# Patient Record
Sex: FEMALE | Race: WHITE | NOT HISPANIC OR LATINO
[De-identification: names, ages, dates, MRNs, and addresses within clinical notes are randomized per-mention and may not be internally consistent; named-entity substitution may affect disease eponyms.]

---

## 2017-11-07 PROBLEM — Z00.00 ENCOUNTER FOR PREVENTIVE HEALTH EXAMINATION: Status: ACTIVE | Noted: 2017-11-07

## 2017-11-29 ENCOUNTER — LABORATORY RESULT (OUTPATIENT)
Age: 22
End: 2017-11-29

## 2017-11-29 ENCOUNTER — APPOINTMENT (OUTPATIENT)
Dept: GASTROENTEROLOGY | Facility: CLINIC | Age: 22
End: 2017-11-29
Payer: COMMERCIAL

## 2017-11-29 VITALS
WEIGHT: 178 LBS | HEIGHT: 67 IN | DIASTOLIC BLOOD PRESSURE: 78 MMHG | TEMPERATURE: 97.5 F | OXYGEN SATURATION: 97 % | SYSTOLIC BLOOD PRESSURE: 112 MMHG | RESPIRATION RATE: 14 BRPM | HEART RATE: 69 BPM | BODY MASS INDEX: 27.94 KG/M2

## 2017-11-29 DIAGNOSIS — F15.90 OTHER STIMULANT USE, UNSPECIFIED, UNCOMPLICATED: ICD-10-CM

## 2017-11-29 DIAGNOSIS — Z80.0 FAMILY HISTORY OF MALIGNANT NEOPLASM OF DIGESTIVE ORGANS: ICD-10-CM

## 2017-11-29 DIAGNOSIS — Z78.9 OTHER SPECIFIED HEALTH STATUS: ICD-10-CM

## 2017-11-29 DIAGNOSIS — K58.2 MIXED IRRITABLE BOWEL SYNDROME: ICD-10-CM

## 2017-11-29 DIAGNOSIS — Z87.09 PERSONAL HISTORY OF OTHER DISEASES OF THE RESPIRATORY SYSTEM: ICD-10-CM

## 2017-11-29 DIAGNOSIS — Z87.19 PERSONAL HISTORY OF OTHER DISEASES OF THE DIGESTIVE SYSTEM: ICD-10-CM

## 2017-11-29 DIAGNOSIS — R10.13 EPIGASTRIC PAIN: ICD-10-CM

## 2017-11-29 PROCEDURE — 99205 OFFICE O/P NEW HI 60 MIN: CPT | Mod: 25

## 2017-11-29 PROCEDURE — 36415 COLL VENOUS BLD VENIPUNCTURE: CPT

## 2017-11-30 PROBLEM — Z87.09 HISTORY OF ASTHMA: Status: RESOLVED | Noted: 2017-11-30 | Resolved: 2017-11-30

## 2017-11-30 PROBLEM — Z78.9 NEVER SMOKED TOBACCO: Status: ACTIVE | Noted: 2017-11-29

## 2017-11-30 PROBLEM — R10.13 DYSPEPSIA: Status: ACTIVE | Noted: 2017-11-30

## 2017-11-30 PROBLEM — Z87.19 HISTORY OF HEMORRHOIDS: Status: RESOLVED | Noted: 2017-11-30 | Resolved: 2017-11-30

## 2017-11-30 PROBLEM — F15.90 CAFFEINE USE: Status: ACTIVE | Noted: 2017-11-29

## 2017-11-30 RX ORDER — MULTIVIT-MIN/IRON FUM/FOLIC AC 7.5 MG-4
TABLET ORAL
Refills: 0 | Status: ACTIVE | COMMUNITY

## 2017-12-05 ENCOUNTER — TRANSCRIPTION ENCOUNTER (OUTPATIENT)
Age: 22
End: 2017-12-05

## 2017-12-10 LAB
25(OH)D3 SERPL-MCNC: 15.9 NG/ML
ADJUSTED MITOGEN: >10 IU/ML
ADJUSTED TB AG: 0.01 IU/ML
ALBUMIN SERPL ELPH-MCNC: 4.7 G/DL
ALP BLD-CCNC: 47 U/L
ALT SERPL-CCNC: 10 U/L
ANION GAP SERPL CALC-SCNC: 18 MMOL/L
APPEARANCE: CLEAR
AST SERPL-CCNC: 16 U/L
BASOPHILS # BLD AUTO: 0.04 K/UL
BASOPHILS NFR BLD AUTO: 0.5 %
BILIRUB SERPL-MCNC: 0.5 MG/DL
BILIRUBIN URINE: NEGATIVE
BLOOD URINE: NEGATIVE
BUN SERPL-MCNC: 6 MG/DL
CALCIUM SERPL-MCNC: 10.1 MG/DL
CHLORIDE SERPL-SCNC: 102 MMOL/L
CO2 SERPL-SCNC: 22 MMOL/L
COLOR: ABNORMAL
CREAT SERPL-MCNC: 0.66 MG/DL
EOSINOPHIL # BLD AUTO: 0.51 K/UL
EOSINOPHIL NFR BLD AUTO: 6.6 %
GLUCOSE QUALITATIVE U: NEGATIVE MG/DL
GLUCOSE SERPL-MCNC: 92 MG/DL
HBA1C MFR BLD HPLC: 5.2 %
HCG SERPL-MCNC: <1 MIU/ML
HCT VFR BLD CALC: 40.1 %
HGB BLD-MCNC: 13.3 G/DL
HIV1+2 AB SPEC QL IA.RAPID: NONREACTIVE
HIVABINT: NORMAL
IGA SER QL IEP: 253 MG/DL
IMM GRANULOCYTES NFR BLD AUTO: 0.1 %
IRON SATN MFR SERPL: 17 %
IRON SERPL-MCNC: 56 UG/DL
KETONES URINE: ABNORMAL
LEUKOCYTE ESTERASE URINE: NEGATIVE
LYMPHOCYTES # BLD AUTO: 1.07 K/UL
LYMPHOCYTES NFR BLD AUTO: 13.9 %
M TB IFN-G BLD-IMP: NEGATIVE
MAN DIFF?: NORMAL
MCHC RBC-ENTMCNC: 28.1 PG
MCHC RBC-ENTMCNC: 33.2 GM/DL
MCV RBC AUTO: 84.8 FL
MONOCYTES # BLD AUTO: 0.58 K/UL
MONOCYTES NFR BLD AUTO: 7.5 %
NEUTROPHILS # BLD AUTO: 5.48 K/UL
NEUTROPHILS NFR BLD AUTO: 71.4 %
NITRITE URINE: NEGATIVE
PH URINE: 7
PLATELET # BLD AUTO: 323 K/UL
POTASSIUM SERPL-SCNC: 4 MMOL/L
PROT SERPL-MCNC: 8.2 G/DL
PROTEIN URINE: ABNORMAL MG/DL
QUANTIFERON GOLD NIL: 0.03 IU/ML
RBC # BLD: 4.73 M/UL
RBC # FLD: 13.9 %
SODIUM SERPL-SCNC: 142 MMOL/L
SPECIFIC GRAVITY URINE: 1.03
T4 FREE SERPL-MCNC: 0.9 NG/DL
TIBC SERPL-MCNC: 324 UG/DL
TSH SERPL-ACNC: 1.2 UIU/ML
TTG IGA SER IA-ACNC: <5 UNITS
TTG IGA SER-ACNC: NEGATIVE
UIBC SERPL-MCNC: 268 UG/DL
UROBILINOGEN URINE: NEGATIVE MG/DL
VIT B12 SERPL-MCNC: 425 PG/ML
WBC # FLD AUTO: 7.69 K/UL

## 2018-04-07 ENCOUNTER — EMERGENCY (EMERGENCY)
Facility: HOSPITAL | Age: 23
LOS: 1 days | Discharge: HOME | End: 2018-04-07
Attending: EMERGENCY MEDICINE | Admitting: EMERGENCY MEDICINE
Payer: COMMERCIAL

## 2018-04-07 VITALS
TEMPERATURE: 98 F | HEART RATE: 73 BPM | RESPIRATION RATE: 14 BRPM | OXYGEN SATURATION: 100 % | SYSTOLIC BLOOD PRESSURE: 124 MMHG | DIASTOLIC BLOOD PRESSURE: 78 MMHG

## 2018-04-07 LAB
ALBUMIN SERPL ELPH-MCNC: 4.2 G/DL — SIGNIFICANT CHANGE UP (ref 3.3–5)
ALP SERPL-CCNC: 55 U/L — SIGNIFICANT CHANGE UP (ref 40–120)
ALT FLD-CCNC: 10 U/L — SIGNIFICANT CHANGE UP (ref 4–33)
AMPHET UR-MCNC: NEGATIVE — SIGNIFICANT CHANGE UP
APAP SERPL-MCNC: < 15 UG/ML — LOW (ref 15–25)
APPEARANCE UR: CLEAR — SIGNIFICANT CHANGE UP
AST SERPL-CCNC: 12 U/L — SIGNIFICANT CHANGE UP (ref 4–32)
BARBITURATES UR SCN-MCNC: NEGATIVE — SIGNIFICANT CHANGE UP
BASOPHILS # BLD AUTO: 0.07 K/UL — SIGNIFICANT CHANGE UP (ref 0–0.2)
BASOPHILS NFR BLD AUTO: 0.8 % — SIGNIFICANT CHANGE UP (ref 0–2)
BENZODIAZ UR-MCNC: NEGATIVE — SIGNIFICANT CHANGE UP
BILIRUB SERPL-MCNC: 0.2 MG/DL — SIGNIFICANT CHANGE UP (ref 0.2–1.2)
BILIRUB UR-MCNC: NEGATIVE — SIGNIFICANT CHANGE UP
BLOOD UR QL VISUAL: HIGH
BUN SERPL-MCNC: 6 MG/DL — LOW (ref 7–23)
CALCIUM SERPL-MCNC: 9.2 MG/DL — SIGNIFICANT CHANGE UP (ref 8.4–10.5)
CANNABINOIDS UR-MCNC: NEGATIVE — SIGNIFICANT CHANGE UP
CHLORIDE SERPL-SCNC: 103 MMOL/L — SIGNIFICANT CHANGE UP (ref 98–107)
CO2 SERPL-SCNC: 25 MMOL/L — SIGNIFICANT CHANGE UP (ref 22–31)
COCAINE METAB.OTHER UR-MCNC: NEGATIVE — SIGNIFICANT CHANGE UP
COLOR SPEC: SIGNIFICANT CHANGE UP
CREAT SERPL-MCNC: 0.55 MG/DL — SIGNIFICANT CHANGE UP (ref 0.5–1.3)
EOSINOPHIL # BLD AUTO: 0.3 K/UL — SIGNIFICANT CHANGE UP (ref 0–0.5)
EOSINOPHIL NFR BLD AUTO: 3.6 % — SIGNIFICANT CHANGE UP (ref 0–6)
ETHANOL BLD-MCNC: < 10 MG/DL — SIGNIFICANT CHANGE UP
GLUCOSE SERPL-MCNC: 87 MG/DL — SIGNIFICANT CHANGE UP (ref 70–99)
GLUCOSE UR-MCNC: NEGATIVE — SIGNIFICANT CHANGE UP
HCG UR-SCNC: NEGATIVE — SIGNIFICANT CHANGE UP
HCT VFR BLD CALC: 39.9 % — SIGNIFICANT CHANGE UP (ref 34.5–45)
HGB BLD-MCNC: 12.7 G/DL — SIGNIFICANT CHANGE UP (ref 11.5–15.5)
IMM GRANULOCYTES # BLD AUTO: 0.01 # — SIGNIFICANT CHANGE UP
IMM GRANULOCYTES NFR BLD AUTO: 0.1 % — SIGNIFICANT CHANGE UP (ref 0–1.5)
KETONES UR-MCNC: NEGATIVE — SIGNIFICANT CHANGE UP
LEUKOCYTE ESTERASE UR-ACNC: SIGNIFICANT CHANGE UP
LYMPHOCYTES # BLD AUTO: 2.39 K/UL — SIGNIFICANT CHANGE UP (ref 1–3.3)
LYMPHOCYTES # BLD AUTO: 28.9 % — SIGNIFICANT CHANGE UP (ref 13–44)
MCHC RBC-ENTMCNC: 27.9 PG — SIGNIFICANT CHANGE UP (ref 27–34)
MCHC RBC-ENTMCNC: 31.8 % — LOW (ref 32–36)
MCV RBC AUTO: 87.7 FL — SIGNIFICANT CHANGE UP (ref 80–100)
METHADONE UR-MCNC: NEGATIVE — SIGNIFICANT CHANGE UP
MONOCYTES # BLD AUTO: 0.5 K/UL — SIGNIFICANT CHANGE UP (ref 0–0.9)
MONOCYTES NFR BLD AUTO: 6.1 % — SIGNIFICANT CHANGE UP (ref 2–14)
MUCOUS THREADS # UR AUTO: SIGNIFICANT CHANGE UP
NEUTROPHILS # BLD AUTO: 4.99 K/UL — SIGNIFICANT CHANGE UP (ref 1.8–7.4)
NEUTROPHILS NFR BLD AUTO: 60.5 % — SIGNIFICANT CHANGE UP (ref 43–77)
NITRITE UR-MCNC: NEGATIVE — SIGNIFICANT CHANGE UP
NRBC # FLD: 0 — SIGNIFICANT CHANGE UP
OPIATES UR-MCNC: NEGATIVE — SIGNIFICANT CHANGE UP
OXYCODONE UR-MCNC: NEGATIVE — SIGNIFICANT CHANGE UP
PCP UR-MCNC: NEGATIVE — SIGNIFICANT CHANGE UP
PH UR: 7.5 — SIGNIFICANT CHANGE UP (ref 4.6–8)
PLATELET # BLD AUTO: 383 K/UL — SIGNIFICANT CHANGE UP (ref 150–400)
PMV BLD: 9.6 FL — SIGNIFICANT CHANGE UP (ref 7–13)
POTASSIUM SERPL-MCNC: 4 MMOL/L — SIGNIFICANT CHANGE UP (ref 3.5–5.3)
POTASSIUM SERPL-SCNC: 4 MMOL/L — SIGNIFICANT CHANGE UP (ref 3.5–5.3)
PROT SERPL-MCNC: 7.8 G/DL — SIGNIFICANT CHANGE UP (ref 6–8.3)
PROT UR-MCNC: NEGATIVE MG/DL — SIGNIFICANT CHANGE UP
RBC # BLD: 4.55 M/UL — SIGNIFICANT CHANGE UP (ref 3.8–5.2)
RBC # FLD: 13.1 % — SIGNIFICANT CHANGE UP (ref 10.3–14.5)
RBC CASTS # UR COMP ASSIST: SIGNIFICANT CHANGE UP (ref 0–?)
SALICYLATES SERPL-MCNC: < 5 MG/DL — LOW (ref 15–30)
SODIUM SERPL-SCNC: 141 MMOL/L — SIGNIFICANT CHANGE UP (ref 135–145)
SP GR SPEC: 1.01 — SIGNIFICANT CHANGE UP (ref 1–1.04)
SP GR UR: 1.01 — SIGNIFICANT CHANGE UP (ref 1–1.03)
SQUAMOUS # UR AUTO: SIGNIFICANT CHANGE UP
TSH SERPL-MCNC: 1.14 UIU/ML — SIGNIFICANT CHANGE UP (ref 0.27–4.2)
UROBILINOGEN FLD QL: NORMAL MG/DL — SIGNIFICANT CHANGE UP
WBC # BLD: 8.26 K/UL — SIGNIFICANT CHANGE UP (ref 3.8–10.5)
WBC # FLD AUTO: 8.26 K/UL — SIGNIFICANT CHANGE UP (ref 3.8–10.5)
WBC UR QL: SIGNIFICANT CHANGE UP (ref 0–?)

## 2018-04-07 PROCEDURE — 99285 EMERGENCY DEPT VISIT HI MDM: CPT | Mod: 25

## 2018-04-07 PROCEDURE — 93010 ELECTROCARDIOGRAM REPORT: CPT

## 2018-04-07 NOTE — ED PROVIDER NOTE - OBJECTIVE STATEMENT
21 y/o F hx  Psychosis BIB family  w c/o bizarre behaviour secondary to  medication non compliance. Mother states that patient has been increasingly paranoid, not sleeping and has not been function at her baseline. Patient was recently discharged from [facility], 2 -3 weeks ago.   Denies falling, punching   or kicking any objects.  Denies pain, SOB, fever, chills, chest/ abdominal discomfort. Denies SI/HI/AH/VH.  No evidence of physical injuries, broken skin or ,deformities. Denies recent use of alcohol or illicit drugs.

## 2018-04-07 NOTE — ED PROVIDER NOTE - MEDICAL DECISION MAKING DETAILS
23 y/o F hx  Psychosis   Labs, Urine Tox/UA, EKG, HCG. No evidence of physical injuries, broken skin or deformities.   Medical evaluation performed. There is no clinical evidence of intoxication or any acute medical problem requiring immediate intervention. Patient is awaiting psychiatric consultation. Final disposition will be determined by psychiatrist.

## 2018-04-07 NOTE — ED ADULT TRIAGE NOTE - CHIEF COMPLAINT QUOTE
Pt brought in by her mother with concerns that the pt is exhibiting behaviors that are similar to an episode ~1 month ago which led the pt to be diagnosed with psychosis. In addition, the mother states that the pt has not been compliant with her psychiatric medications as prescribed. Pt denies any si/hi/avh/etoh-substance use

## 2018-04-08 VITALS
RESPIRATION RATE: 16 BRPM | HEART RATE: 70 BPM | DIASTOLIC BLOOD PRESSURE: 72 MMHG | SYSTOLIC BLOOD PRESSURE: 115 MMHG | TEMPERATURE: 98 F | OXYGEN SATURATION: 100 %

## 2018-04-08 DIAGNOSIS — R69 ILLNESS, UNSPECIFIED: ICD-10-CM

## 2018-04-08 DIAGNOSIS — F31.2 BIPOLAR DISORDER, CURRENT EPISODE MANIC SEVERE WITH PSYCHOTIC FEATURES: ICD-10-CM

## 2018-04-08 RX ORDER — ARIPIPRAZOLE 15 MG/1
5 TABLET ORAL DAILY
Qty: 0 | Refills: 0 | Status: DISCONTINUED | OUTPATIENT
Start: 2018-04-08 | End: 2018-04-11

## 2018-04-08 RX ADMIN — ARIPIPRAZOLE 5 MILLIGRAM(S): 15 TABLET ORAL at 07:49
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
## 2018-04-08 NOTE — ED BEHAVIORAL HEALTH NOTE - BEHAVIORAL HEALTH NOTE
Patient seen and evaluated. Took aripiprazole. Calm this AM.  On exam, pt is awake and alert, affect is neutral, somewhat bright, mildly inappropriate, speech is fluent, thought content somewhat impoverished. No suicidal ideations expressed. Limited insight  Vitals: 115/72 70 temp 98.1  A/P 22 year-old with disorganization/psychosis  in the context of non-adherence with meds, has been admitted 9.39, awaiting bed availability  Adherent with meds today  Continue aripiprazole

## 2018-04-08 NOTE — ED ADULT NURSE REASSESSMENT NOTE - NS ED NURSE REASSESS COMMENT FT1
Patient transported to [facility] via EMS, report given to Brook PFEIFFER, safety maintained.

## 2018-04-08 NOTE — ED BEHAVIORAL HEALTH ASSESSMENT NOTE - PRIMARY DX
Bipolar affective disorder, currently manic, severe, with psychotic features Deferred condition on axis II

## 2018-04-08 NOTE — ED BEHAVIORAL HEALTH NOTE - BEHAVIORAL HEALTH NOTE
Found a bed for patient at [facility], have spoken with Paula Fuentes NP, gave hand-off, 2 PC completed with administrative nurse on duty. Found a bed for patient at [facility], have spoken with Paula Fuentes NP, gave hand-off, 2 PC completed with administrative nurse on duty. Transfer discussed with patient and family, everyone is in agreement with plan.

## 2018-04-08 NOTE — ED BEHAVIORAL HEALTH NOTE - BEHAVIORAL HEALTH NOTE
Parisar was informed patient required inpatient admission and no beds were available at [facility] or [facility].  Writer identified a bed at [facility] based on bedboard.  Writer called phone number on bedboard  and was told by person who answered that she is the Unit chief and this is her private phone number.  She requested writer call the unit directly.  Writer called inpatient unit  where the phone was answered by Brook.  Brook stated, “this is an emergency phone call the other line” and hung up. Writer called again to obtain “the other line” but the line was busy for several minutes.  Writer called again and was transferred to the emergency room where writer spoke to Stella.  Stella in the ER states writer needs to speak to the unit directly.  Writer explained that writer had called the unit twice already and she placed call on hold while she found out what the protocol is to directly transfer a psychiatric patient to their service.  Writer was transferred to Arturo who then transferred call to Nurse Practitioner Mily Dumont .  Writer spoke to Ms. Dumont who requested writer fax clinical information to .  Ms. Dumont Nurse Practitioner states she is reviewing patients from [facility] and need to review patient’s chart to determine acceptance or denial. Parisar was informed patient required inpatient admission and no beds were available at [facility] or [facility].  Writer called [facility], there were no beds available.  Writer identified a bed at [facility] based on bedboard.  Writer called phone number on bedboard  and was told by person who answered that she is the Unit chief and this is her private phone number.  She requested writer call the unit directly.  Writer called inpatient unit  where the phone was answered by Brook.  Brook stated, “this is an emergency phone call the other line” and hung up. Writer called again to obtain “the other line” but the line was busy for several minutes.  Writer called again and was transferred to the emergency room where writer spoke to Stella.  Stella in the ER states writer needs to speak to the unit directly.  Writer explained that writer had called the unit twice already and she placed call on hold while she found out what the protocol is to directly transfer a psychiatric patient to their service.  Writer was transferred to Arturo who then transferred call to Nurse Practitioner Mily Dumont .  Writer spoke to Ms. Dumont who requested writer fax clinical information to .  Ms. Dumont Nurse Practitioner states she is reviewing patients from [place] and need to review patient’s chart to determine acceptance or denial.

## 2018-04-08 NOTE — ED BEHAVIORAL HEALTH ASSESSMENT NOTE - RISK ASSESSMENT
Pt has decompensated in the context of medication noncompliance and is acutely manic, posing a danger to self and others. Pt requires further stabilization in inpatient setting.
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
## 2018-07-23 PROBLEM — Z78.9 ALCOHOL USE: Status: ACTIVE | Noted: 2017-11-29

## 2018-07-23 PROBLEM — Z80.0 FAMILY HISTORY OF COLON CANCER: Status: INACTIVE | Noted: 2017-11-30

## 2018-11-26 ENCOUNTER — TRANSCRIPTION ENCOUNTER (OUTPATIENT)
Age: 23
End: 2018-11-26

## 2019-07-01 ENCOUNTER — TRANSCRIPTION ENCOUNTER (OUTPATIENT)
Age: 24
End: 2019-07-01

## 2020-07-09 ENCOUNTER — TRANSCRIPTION ENCOUNTER (OUTPATIENT)
Age: 25
End: 2020-07-09

## 2020-12-30 ENCOUNTER — TRANSCRIPTION ENCOUNTER (OUTPATIENT)
Age: 25
End: 2020-12-30

## 2022-12-12 ENCOUNTER — NON-APPOINTMENT (OUTPATIENT)
Age: 27
End: 2022-12-12

## 2023-01-10 ENCOUNTER — OUTPATIENT (OUTPATIENT)
Dept: OUTPATIENT SERVICES | Facility: HOSPITAL | Age: 28
LOS: 1 days | Discharge: STILL ADMITTED OR ADMINISTRATIVE | End: 2023-01-10

## 2023-01-10 PROBLEM — F29 UNSPECIFIED PSYCHOSIS NOT DUE TO A SUBSTANCE OR KNOWN PHYSIOLOGICAL CONDITION: Chronic | Status: ACTIVE | Noted: 2018-04-07

## 2023-01-11 ENCOUNTER — EMERGENCY (EMERGENCY)
Facility: HOSPITAL | Age: 28
LOS: 0 days | Discharge: ANOTHER HOSPITAL | End: 2023-01-12
Attending: EMERGENCY MEDICINE
Payer: COMMERCIAL

## 2023-01-11 VITALS — WEIGHT: 160.06 LBS | HEIGHT: 71 IN

## 2023-01-11 DIAGNOSIS — Z20.822 CONTACT WITH AND (SUSPECTED) EXPOSURE TO COVID-19: ICD-10-CM

## 2023-01-11 DIAGNOSIS — Z91.018 ALLERGY TO OTHER FOODS: ICD-10-CM

## 2023-01-11 DIAGNOSIS — F29 UNSPECIFIED PSYCHOSIS NOT DUE TO A SUBSTANCE OR KNOWN PHYSIOLOGICAL CONDITION: ICD-10-CM

## 2023-01-11 LAB
ALBUMIN SERPL ELPH-MCNC: 3.6 G/DL — SIGNIFICANT CHANGE UP (ref 3.3–5)
ALP SERPL-CCNC: 38 U/L — LOW (ref 40–120)
ALT FLD-CCNC: 20 U/L — SIGNIFICANT CHANGE UP (ref 12–78)
AMPHET UR-MCNC: NEGATIVE — SIGNIFICANT CHANGE UP
ANION GAP SERPL CALC-SCNC: 10 MMOL/L — SIGNIFICANT CHANGE UP (ref 5–17)
APAP SERPL-MCNC: < 2 UG/ML (ref 10–30)
APPEARANCE UR: CLEAR — SIGNIFICANT CHANGE UP
AST SERPL-CCNC: 33 U/L — SIGNIFICANT CHANGE UP (ref 15–37)
BACTERIA # UR AUTO: ABNORMAL
BARBITURATES UR SCN-MCNC: NEGATIVE — SIGNIFICANT CHANGE UP
BASOPHILS # BLD AUTO: 0.05 K/UL — SIGNIFICANT CHANGE UP (ref 0–0.2)
BASOPHILS NFR BLD AUTO: 0.6 % — SIGNIFICANT CHANGE UP (ref 0–2)
BENZODIAZ UR-MCNC: POSITIVE — SIGNIFICANT CHANGE UP
BILIRUB SERPL-MCNC: 1.1 MG/DL — SIGNIFICANT CHANGE UP (ref 0.2–1.2)
BILIRUB UR-MCNC: NEGATIVE — SIGNIFICANT CHANGE UP
BUN SERPL-MCNC: 11 MG/DL — SIGNIFICANT CHANGE UP (ref 7–23)
CALCIUM SERPL-MCNC: 8.4 MG/DL — LOW (ref 8.5–10.1)
CHLORIDE SERPL-SCNC: 109 MMOL/L — HIGH (ref 96–108)
CO2 SERPL-SCNC: 22 MMOL/L — SIGNIFICANT CHANGE UP (ref 22–31)
COCAINE METAB.OTHER UR-MCNC: NEGATIVE — SIGNIFICANT CHANGE UP
COLOR SPEC: YELLOW — SIGNIFICANT CHANGE UP
COVID-19 SPIKE DOMAIN AB INTERP: POSITIVE
COVID-19 SPIKE DOMAIN ANTIBODY RESULT: >250 U/ML — HIGH
CREAT SERPL-MCNC: 0.59 MG/DL — SIGNIFICANT CHANGE UP (ref 0.5–1.3)
DIFF PNL FLD: ABNORMAL
EGFR: 127 ML/MIN/1.73M2 — SIGNIFICANT CHANGE UP
EOSINOPHIL # BLD AUTO: 0.06 K/UL — SIGNIFICANT CHANGE UP (ref 0–0.5)
EOSINOPHIL NFR BLD AUTO: 0.7 % — SIGNIFICANT CHANGE UP (ref 0–6)
EPI CELLS # UR: ABNORMAL
ETHANOL SERPL-MCNC: <10 MG/DL — SIGNIFICANT CHANGE UP (ref 0–10)
FLUAV AG NPH QL: SIGNIFICANT CHANGE UP
FLUBV AG NPH QL: SIGNIFICANT CHANGE UP
GLUCOSE SERPL-MCNC: 75 MG/DL — SIGNIFICANT CHANGE UP (ref 70–99)
GLUCOSE UR QL: NEGATIVE MG/DL — SIGNIFICANT CHANGE UP
HCG SERPL-ACNC: <1 MIU/ML — SIGNIFICANT CHANGE UP
HCT VFR BLD CALC: 32.5 % — LOW (ref 34.5–45)
HGB BLD-MCNC: 10.7 G/DL — LOW (ref 11.5–15.5)
IMM GRANULOCYTES NFR BLD AUTO: 0.4 % — SIGNIFICANT CHANGE UP (ref 0–0.9)
KETONES UR-MCNC: ABNORMAL
LEUKOCYTE ESTERASE UR-ACNC: ABNORMAL
LYMPHOCYTES # BLD AUTO: 1.33 K/UL — SIGNIFICANT CHANGE UP (ref 1–3.3)
LYMPHOCYTES # BLD AUTO: 16.1 % — SIGNIFICANT CHANGE UP (ref 13–44)
MCHC RBC-ENTMCNC: 27 PG — SIGNIFICANT CHANGE UP (ref 27–34)
MCHC RBC-ENTMCNC: 32.9 G/DL — SIGNIFICANT CHANGE UP (ref 32–36)
MCV RBC AUTO: 82.1 FL — SIGNIFICANT CHANGE UP (ref 80–100)
METHADONE UR-MCNC: NEGATIVE — SIGNIFICANT CHANGE UP
MONOCYTES # BLD AUTO: 0.48 K/UL — SIGNIFICANT CHANGE UP (ref 0–0.9)
MONOCYTES NFR BLD AUTO: 5.8 % — SIGNIFICANT CHANGE UP (ref 2–14)
NEUTROPHILS # BLD AUTO: 6.32 K/UL — SIGNIFICANT CHANGE UP (ref 1.8–7.4)
NEUTROPHILS NFR BLD AUTO: 76.4 % — SIGNIFICANT CHANGE UP (ref 43–77)
NITRITE UR-MCNC: NEGATIVE — SIGNIFICANT CHANGE UP
NRBC # BLD: 0 /100 WBCS — SIGNIFICANT CHANGE UP (ref 0–0)
OPIATES UR-MCNC: NEGATIVE — SIGNIFICANT CHANGE UP
PCP SPEC-MCNC: SIGNIFICANT CHANGE UP
PCP UR-MCNC: NEGATIVE — SIGNIFICANT CHANGE UP
PH UR: 6 — SIGNIFICANT CHANGE UP (ref 5–8)
PLATELET # BLD AUTO: 295 K/UL — SIGNIFICANT CHANGE UP (ref 150–400)
POTASSIUM SERPL-MCNC: 3.7 MMOL/L — SIGNIFICANT CHANGE UP (ref 3.5–5.3)
POTASSIUM SERPL-SCNC: 3.7 MMOL/L — SIGNIFICANT CHANGE UP (ref 3.5–5.3)
PROT SERPL-MCNC: 6.9 GM/DL — SIGNIFICANT CHANGE UP (ref 6–8.3)
PROT UR-MCNC: 30 MG/DL
RBC # BLD: 3.96 M/UL — SIGNIFICANT CHANGE UP (ref 3.8–5.2)
RBC # FLD: 14 % — SIGNIFICANT CHANGE UP (ref 10.3–14.5)
RBC CASTS # UR COMP ASSIST: SIGNIFICANT CHANGE UP /HPF (ref 0–4)
SALICYLATES SERPL-MCNC: <1.7 MG/DL — LOW (ref 2.8–20)
SARS-COV-2 IGG+IGM SERPL QL IA: >250 U/ML — HIGH
SARS-COV-2 IGG+IGM SERPL QL IA: POSITIVE
SARS-COV-2 RNA SPEC QL NAA+PROBE: SIGNIFICANT CHANGE UP
SODIUM SERPL-SCNC: 141 MMOL/L — SIGNIFICANT CHANGE UP (ref 135–145)
SP GR SPEC: 1.02 — SIGNIFICANT CHANGE UP (ref 1.01–1.02)
THC UR QL: POSITIVE — SIGNIFICANT CHANGE UP
TSH SERPL-MCNC: 1.81 UIU/ML — SIGNIFICANT CHANGE UP (ref 0.36–3.74)
UROBILINOGEN FLD QL: NEGATIVE MG/DL — SIGNIFICANT CHANGE UP
WBC # BLD: 8.27 K/UL — SIGNIFICANT CHANGE UP (ref 3.8–10.5)
WBC # FLD AUTO: 8.27 K/UL — SIGNIFICANT CHANGE UP (ref 3.8–10.5)
WBC UR QL: ABNORMAL

## 2023-01-11 PROCEDURE — 70450 CT HEAD/BRAIN W/O DYE: CPT | Mod: 26,MA

## 2023-01-11 PROCEDURE — 90792 PSYCH DIAG EVAL W/MED SRVCS: CPT | Mod: 95

## 2023-01-11 PROCEDURE — 99285 EMERGENCY DEPT VISIT HI MDM: CPT

## 2023-01-11 PROCEDURE — 93010 ELECTROCARDIOGRAM REPORT: CPT

## 2023-01-11 RX ORDER — HALOPERIDOL DECANOATE 100 MG/ML
5 INJECTION INTRAMUSCULAR ONCE
Refills: 0 | Status: COMPLETED | OUTPATIENT
Start: 2023-01-11 | End: 2023-01-11

## 2023-01-11 RX ORDER — MIDAZOLAM HYDROCHLORIDE 1 MG/ML
4 INJECTION, SOLUTION INTRAMUSCULAR; INTRAVENOUS ONCE
Refills: 0 | Status: DISCONTINUED | OUTPATIENT
Start: 2023-01-11 | End: 2023-01-11

## 2023-01-11 RX ADMIN — HALOPERIDOL DECANOATE 5 MILLIGRAM(S): 100 INJECTION INTRAMUSCULAR at 05:27

## 2023-01-11 RX ADMIN — MIDAZOLAM HYDROCHLORIDE 4 MILLIGRAM(S): 1 INJECTION, SOLUTION INTRAMUSCULAR; INTRAVENOUS at 05:27

## 2023-01-11 RX ADMIN — Medication 2 MILLIGRAM(S): at 12:21

## 2023-01-11 RX ADMIN — HALOPERIDOL DECANOATE 5 MILLIGRAM(S): 100 INJECTION INTRAMUSCULAR at 02:42

## 2023-01-11 NOTE — ED BEHAVIORAL HEALTH ASSESSMENT NOTE - RISK ASSESSMENT
risk factors: non-compliance w/ treatment, social stressors, unstable housing, hx of hospitalizations    protective: has child, is knowledgeable about MH resources

## 2023-01-11 NOTE — ED PROVIDER NOTE - PHYSICAL EXAMINATION
Gen: AAOx3, NAD, agitated, aggressive, screaming, running about hospital grounds and inside ER  Head: ncat, perrla, eomi b/l  Neck: supple, no lymphadenopathy, no midline deviation  Heart: rrr, no m/r/g  Lungs: CTA b/l, no rales/ronchi/wheezes  Abd: soft, nontender, non-distended, no rebound or guarding  Ext: no clubbing/cyanosis/edema  Neuro: sensation and muscle strength intact b/l, steady gait, no focal weakness or sensory loss

## 2023-01-11 NOTE — ED BEHAVIORAL HEALTH ASSESSMENT NOTE - SUMMARY
The patient is a 26 yo woman, domiciled w/ various family members, has 3 yo son, with no sig PMH, and psych hx of bipolar disorder, cannabis use disorder, PTSD, 3 prior hospitalizations (most recently 2y ago in [place]), non-adherent to outpatient care, who self-presents with disorganized behavior after first eloping during evaluation at [facility] yesterday 1/10, where there was concern that she was acutely manic.    While patient is not presenting with any overt manic/psychotic symptoms currently, history from family is highly concerning for such symptoms including flight of ideas, grandiosity, mood lability/instability, irritability and aggression. Furthermore, the patient is a very poor historian with tangential speech, poor relatedness. The context of her presentation of walking out from the crisis center, wandering, then presenting to this ED agitated w/o being able to clearly describe preceding events is concerning for disorganized behavior and significantly impaired functioning. She is also not engaged in outpatient treatment. Therefore she meets criteria for involuntary admission due to inability to care properly for herself. While cannabis use may be a significant contributing factor, there is no e/o at this time that here symptoms are improving under observation or are explained entirely by intoxication.    Plan:  - admit to psych involuntary, 9.27 legals  - pt has been accepted for transfer to [facility] but per inpatient unit request, will be transferred in the AM  - can start Risperdal 1mg BID  - for acute agitation, can give Haldol 5mg + Ativan 2mg + Benadryl 50mg PO/IM q6h PRN

## 2023-01-11 NOTE — ED PROVIDER NOTE - OBJECTIVE STATEMENT
28 yo F with acute psychosis.  Pt. reportedly ran out of [facility] today after being brought there for admission for psychiatric reasons.  Pt. evaded security and left the hospital, reportedly missing or most of the day.  Pt. presents to ER at  with acute psychosis, agitated, screaming, yelling in ER, running around, cussing at staff and other patients, interfering with patient care.  no other specific complaints from patient.   ROS: negative for fever, cough, headache, chest pain, shortness of breath, abd pain, nausea, vomiting, diarrhea, rash, paresthesia, and weakness--all other systems reviewed are negative.   PMH: psychosis; Meds: Denies; SH: Denies smoking/drinking/drug use  Mom: Serena Oneilman, [phone] 28 yo F with acute psychosis.  Pt. reportedly ran out of [facility] today after being brought there for admission for psychiatric reasons.  Pt. evaded security and left the hospital, reportedly missing or most of the day.  Pt. presents to ER at  with acute psychosis, agitated, screaming, yelling in ER, running around, cussing at staff and other patients, interfering with patient care.  no other specific complaints from patient.   Update: pt. was a [facility] crisis center on 1/10/23--she likely ran out of that area, they are not allowed to forcibly restrain patients, no history of pt. being inpatient at [facility], per tele-psych   ROS: negative for fever, cough, headache, chest pain, shortness of breath, abd pain, nausea, vomiting, diarrhea, rash, paresthesia, and weakness--all other systems reviewed are negative.   PMH: psychosis; Meds: Denies; SH: Denies smoking/drinking/drug use  Mom: Serena Oneilman, [phone]

## 2023-01-11 NOTE — ED BEHAVIORAL HEALTH ASSESSMENT NOTE - DESCRIPTION
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 Cooperative during BH assessment

## 2023-01-11 NOTE — ED PROVIDER NOTE - PROGRESS NOTE DETAILS
telepsych called for info: pt. had a visit at [facility] (walk in clinic), and likely left that center, rather than [facility] Anil: Pt care signed out to me at change of shift, pending labs / Psych evaluation. Pt presented to Tele Psych. Anil: D/w Tele Psych, pt will be 2PC admission. Paperwork completed. Pending bed placement. Pt care signed out at change of shift. Patient care received on shift change, bed search pending.  Patient stating that she was under influence of ativan when interviewed, appears more coherent and says that she does not feel she needs admission.  ER course reviewed.  Telepsych team called to reassess, patient informed that she will be reassessed. Patient now acting erratic, tells me she never spoke with me, also told her 1:1 that she took 7 tylenol, however now saying she lied about it to get attention, will obtain acetaminophen level in 4 hours.  Psych team made aware of patient behavior. Patient 4 hour repeat tylenol level negative.  VSS.  Patient accepted to [facility] and is pending transfer this morning. Patient now acting erratic, tells me she never spoke with me, also told her 1:1 that she took 7 tylenol when she was in the bathroom, showed her RN an empty blister packet that she found in the bathroom, however now saying she lied about it to get attention and never took any tylenol, will obtain acetaminophen level in 4 hours.  Psych team made aware of patient behavior.

## 2023-01-11 NOTE — ED BEHAVIORAL HEALTH ASSESSMENT NOTE - HPI (INCLUDE ILLNESS QUALITY, SEVERITY, DURATION, TIMING, CONTEXT, MODIFYING FACTORS, ASSOCIATED SIGNS AND SYMPTOMS)
The patient is a 28 yo woman, domiciled w/ various family members, has 3 yo son, with no sig PMH, and psych hx of bipolar disorder, cannabis use disorder, PTSD, 3 prior hospitalizations (most recently 2y ago in [place]), non-adherent to outpatient care, who self-presents with disorganized behavior after first eloping during evaluation at [facility] yesterday 1/10, where there was concern that she was acutely manic.    On interview, the patient is a poor historian, unable to give a clear timeline of events and unable to clearly explain how she arrived at this ED today. Her speech is muffled, and tangential. Per triage note, patient presented as a walk-in. She seems to indicate now that she had waved down the police, says she stated she wanted to be somewhere warm, and they dropped her off at the hospital. The patient states she initially presented to the crisis center at the behest of her mother for a "mental health evaluation" but cannot specify what the concerns were. She describes being very stressed and overwhelmed juggling two jobs recently but otherwise denies psychiatric concerns. She says her mood has been "really happy." She denies any SI, denies AVH or paranoia.    Please see Crisis Intervention Note from Dariana for further details from that evaluation and corroborative.    Spoke to patient's "cousin"/friend Debra again ([phone]) who states that patient for the past several days has not seemed like herself, with large shift in mood including being unpredictably angry, and "talking about 30 different things." She also says that patient made a post on Facebook with grandiose content. There have been multiple psychosocial stressors related to family & work. Debra corroborates that after pt left the crisis center, she initially could not be found. She apparently made multiple stops including to a restaurant relatively far away and to a smoke shop. Police had been looking for her. Debra also confirms that pt's son is under care of Debra, her sister, and pt's mother altogether.

## 2023-01-11 NOTE — ED BEHAVIORAL HEALTH ASSESSMENT NOTE - NSSUICPROTFACT_PSY_ALL_CORE
Responsibility to children, family, or others/Supportive social network of family or friends/Cultural, spiritual and/or moral attitudes against suicide/Advent beliefs

## 2023-01-11 NOTE — ED BEHAVIORAL HEALTH NOTE - BEHAVIORAL HEALTH NOTE
===================   PRE-HOSPITAL COURSE   ==================   SOURCE:  Frieda PFEIFFER, and ED documentation    DETAILS:  Pt bibFamily to ED. Pt eloped from [facility] and was then brought in to  ED. Agitated during triage process.     ============   ED COURSE   ============   SOURCE: Frieda PFEIFFER, and secondhand ED documentation.   ARRIVAL: Per RN patient bibFamily to ED. Patient cooperative with triage process.    BELONGINGS:  Per RN, patient arrived with belongings. All belongings were provided to hospital security, and patient currently in a gown with a 1:1 staff member.   BEHAVIOR: RN described patient to be calm currently , remains in behavioral and impulse control, and is not in restraints. Pt currently has 1:1 sitter.    Pt is currently not displaying aggression towards staff or others. Per RN, pt presenting with even affect and mood is congruent with affect.   Pt has a linear thought process and is able to answer questions appropriately. RN stated that the patient is denying current SI/HI. Per RN pt denying A/VH.    There are no visible marks, bruises, or lacerations on the body. RN reported that the patient appears to be well groomed, has fair hygiene, and reports pt is IND with mobility and ADLs.    TREATMENT:  Haldol for sedation. No restraints.    VISITORS: None currently

## 2023-01-11 NOTE — ED PROVIDER NOTE - CLINICAL SUMMARY MEDICAL DECISION MAKING FREE TEXT BOX
28 yo F with likely acute psychosis  -basic labs, covid swab, etoh, drug screen, asa/salicylate, tsh, CT brain, CXR, ekg  -haldol for sedation/safety, monitor  -f/u results, reeval, psych eval

## 2023-01-12 ENCOUNTER — INPATIENT (INPATIENT)
Facility: HOSPITAL | Age: 28
LOS: 7 days | Discharge: HOME | End: 2023-01-20
Attending: PSYCHIATRY & NEUROLOGY | Admitting: PSYCHIATRY & NEUROLOGY
Payer: COMMERCIAL

## 2023-01-12 VITALS
HEIGHT: 72 IN | WEIGHT: 170.42 LBS | SYSTOLIC BLOOD PRESSURE: 140 MMHG | HEART RATE: 113 BPM | RESPIRATION RATE: 20 BRPM | DIASTOLIC BLOOD PRESSURE: 99 MMHG | TEMPERATURE: 98 F

## 2023-01-12 VITALS
OXYGEN SATURATION: 99 % | RESPIRATION RATE: 19 BRPM | DIASTOLIC BLOOD PRESSURE: 88 MMHG | HEART RATE: 101 BPM | SYSTOLIC BLOOD PRESSURE: 128 MMHG | TEMPERATURE: 98 F

## 2023-01-12 LAB — APAP SERPL-MCNC: 3 UG/ML — LOW (ref 10–30)

## 2023-01-12 RX ORDER — RISPERIDONE 4 MG/1
1 TABLET ORAL
Refills: 0 | Status: DISCONTINUED | OUTPATIENT
Start: 2023-01-12 | End: 2023-01-19

## 2023-01-12 RX ORDER — HALOPERIDOL DECANOATE 100 MG/ML
5 INJECTION INTRAMUSCULAR EVERY 6 HOURS
Refills: 0 | Status: DISCONTINUED | OUTPATIENT
Start: 2023-01-12 | End: 2023-01-20

## 2023-01-12 RX ADMIN — Medication 1 MILLIGRAM(S): at 02:09

## 2023-01-12 RX ADMIN — RISPERIDONE 1 MILLIGRAM(S): 4 TABLET ORAL at 20:00

## 2023-01-12 RX ADMIN — Medication 1 MILLIGRAM(S): at 06:52

## 2023-01-12 RX ADMIN — Medication 2 MILLIGRAM(S): at 20:01
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
## 2023-01-12 NOTE — BH SOCIAL WORK INITIAL PSYCHOSOCIAL EVALUATION - NSBHSPIRITUALPOWER_PSY_ALL_CORE
What Type Of Note Output Would You Prefer (Optional)?: Bullet Format How Severe Is Your Acne?: mild Is This A New Presentation, Or A Follow-Up?: Acne Additional Comments (Use Complete Sentences): Pt states she started a new hormone medication (norethisterone) in February and noticed that she started gaining weight shortly after and starting to have acne breakouts last month. She thinks it may be correlated. Pt states she stopped taking the medication 2 weeks ago. No

## 2023-01-12 NOTE — BH PATIENT PROFILE - FALL HARM RISK - UNIVERSAL INTERVENTIONS
Bed in lowest position, wheels locked, appropriate side rails in place/Call bell, personal items and telephone in reach/Instruct patient to call for assistance before getting out of bed or chair/Non-slip footwear when patient is out of bed/[place] to call system/Physically safe environment - no spills, clutter or unnecessary equipment/Purposeful Proactive Rounding/Room/bathroom lighting operational, light cord in reach

## 2023-01-12 NOTE — BH SOCIAL WORK INITIAL PSYCHOSOCIAL EVALUATION - OTHER PAST PSYCHIATRIC HISTORY (INCLUDE DETAILS REGARDING ONSET, COURSE OF ILLNESS, INPATIENT/OUTPATIENT TREATMENT)
Patient has a past psychiatric history of bipolar disorder, cannabis use disorder, and PTSD.  She has 3 prior inpatient psychiatric hospitalizations (most recently 2 years ago in [place])

## 2023-01-12 NOTE — BH CHART NOTE - NSEVENTNOTEFT_PSY_ALL_CORE
Contacted close family friend Debra ([phone]) for additional information. She stated patient has used marijuana in past, but has no history of disordered substance use and does not believe illicit substances/street drugs to be a component of the patient's current presentation.

## 2023-01-12 NOTE — ED BEHAVIORAL HEALTH NOTE - BEHAVIORAL HEALTH NOTE
Per RN patient remains in private room on 1:1 and is gowned/wanded. No SI/HI/AH/VH elicited; patient demonstrating waxing/waning episodes of agitation overnight. No social supports present overnight. Patient received Ativan 1mg PO for anxiety/agitation. Remains sleeping in hospital bed. Pending psychiatric admission.

## 2023-01-13 PROCEDURE — 99221 1ST HOSP IP/OBS SF/LOW 40: CPT

## 2023-01-13 PROCEDURE — 93010 ELECTROCARDIOGRAM REPORT: CPT

## 2023-01-13 RX ORDER — PANTOPRAZOLE SODIUM 20 MG/1
40 TABLET, DELAYED RELEASE ORAL
Refills: 0 | Status: DISCONTINUED | OUTPATIENT
Start: 2023-01-13 | End: 2023-01-20

## 2023-01-13 RX ORDER — ACETAMINOPHEN 500 MG
650 TABLET ORAL ONCE
Refills: 0 | Status: COMPLETED | OUTPATIENT
Start: 2023-01-13 | End: 2023-01-13

## 2023-01-13 RX ORDER — LANOLIN ALCOHOL/MO/W.PET/CERES
5 CREAM (GRAM) TOPICAL AT BEDTIME
Refills: 0 | Status: DISCONTINUED | OUTPATIENT
Start: 2023-01-13 | End: 2023-01-20

## 2023-01-13 RX ORDER — ACETAMINOPHEN 500 MG
650 TABLET ORAL EVERY 6 HOURS
Refills: 0 | Status: DISCONTINUED | OUTPATIENT
Start: 2023-01-13 | End: 2023-01-20

## 2023-01-13 RX ORDER — HYDROXYZINE HCL 10 MG
50 TABLET ORAL EVERY 6 HOURS
Refills: 0 | Status: DISCONTINUED | OUTPATIENT
Start: 2023-01-13 | End: 2023-01-20

## 2023-01-13 RX ORDER — PANTOPRAZOLE SODIUM 20 MG/1
40 TABLET, DELAYED RELEASE ORAL ONCE
Refills: 0 | Status: COMPLETED | OUTPATIENT
Start: 2023-01-13 | End: 2023-01-13

## 2023-01-13 RX ORDER — LITHIUM CARBONATE 300 MG/1
300 TABLET, EXTENDED RELEASE ORAL EVERY 12 HOURS
Refills: 0 | Status: DISCONTINUED | OUTPATIENT
Start: 2023-01-13 | End: 2023-01-20

## 2023-01-13 RX ADMIN — LITHIUM CARBONATE 300 MILLIGRAM(S): 300 TABLET, EXTENDED RELEASE ORAL at 20:08

## 2023-01-13 RX ADMIN — Medication 650 MILLIGRAM(S): at 10:53

## 2023-01-13 RX ADMIN — PANTOPRAZOLE SODIUM 40 MILLIGRAM(S): 20 TABLET, DELAYED RELEASE ORAL at 20:07

## 2023-01-13 RX ADMIN — Medication 650 MILLIGRAM(S): at 23:54

## 2023-01-13 RX ADMIN — RISPERIDONE 1 MILLIGRAM(S): 4 TABLET ORAL at 20:07

## 2023-01-13 RX ADMIN — Medication 2 MILLIGRAM(S): at 23:53

## 2023-01-13 RX ADMIN — Medication 50 MILLIGRAM(S): at 22:51

## 2023-01-13 RX ADMIN — Medication 650 MILLIGRAM(S): at 20:07

## 2023-01-13 RX ADMIN — RISPERIDONE 1 MILLIGRAM(S): 4 TABLET ORAL at 08:14

## 2023-01-13 RX ADMIN — Medication 50 MILLIGRAM(S): at 12:38

## 2023-01-13 RX ADMIN — Medication 30 MILLILITER(S): at 20:54

## 2023-01-13 NOTE — CONSULT NOTE ADULT - ASSESSMENT
27 year old woman admitted to [facility] for manic episode.    Bipolar d/o:  Mgmt per primary team    No active medical issues
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
## 2023-01-13 NOTE — BH INPATIENT PSYCHIATRY ASSESSMENT NOTE - RISK ASSESSMENT
rf: non-compliance with tx, significant substance use, residential stability, bipolar disorder   pf: health literacy, employment stability, engagement in care at this time

## 2023-01-13 NOTE — BH TREATMENT PLAN - ANXIETY/PANIC/FEAR NURSING INTERVENTIONS/RECOMMENDATIONS
RN to encourage medication compliance and provide support and education as needed on Dx, coping skills, medication, and safety planning  RN to observe for increasing anxiety and maintain a calm environment.  RN to monitor patient for s/s of anxiety.  RN will assess and intervene for any disruptive behaviors  RN to encourage daily ADL's  RN to encourage group attendance  RN will assess and intervene for any disruptive behaviors

## 2023-01-13 NOTE — BH INPATIENT PSYCHIATRY ASSESSMENT NOTE - CURRENT MEDICATION
MEDICATIONS  (STANDING):  lithium 300 milliGRAM(s) Oral every 12 hours  risperiDONE   Tablet 1 milliGRAM(s) Oral two times a day    MEDICATIONS  (PRN):  acetaminophen     Tablet .. 650 milliGRAM(s) Oral every 6 hours PRN Mild Pain (1 - 3), Moderate Pain (4 - 6), Severe Pain (7 - 10)  haloperidol     Tablet 5 milliGRAM(s) Oral every 6 hours PRN agitation  hydrOXYzine hydrochloride 50 milliGRAM(s) Oral every 6 hours PRN anxiety  LORazepam     Tablet 2 milliGRAM(s) Oral every 6 hours PRN Anxiety  melatonin. 5 milliGRAM(s) Oral at bedtime PRN Insomnia

## 2023-01-13 NOTE — BH INPATIENT PSYCHIATRY ASSESSMENT NOTE - NSBHATTESTCOMMENTATTENDFT_PSY_A_CORE
The patient is a 28 yo woman, domiciled w/ various family members, has 5 yo son, with no sig PMH, and psych hx of bipolar disorder, cannabis use disorder, PTSD, 3 prior hospitalizations (most recently 2y ago in [place]), non-adherent to outpatient care, who self-presents with disorganized behavior after first eloping during evaluation at [facility] yesterday 1/10, where there was concern that she was acutely manic.  I agree with the assessment and plan as above
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
## 2023-01-13 NOTE — BH TREATMENT PLAN - NSTXPSYCHOINTERRN_PSY_ALL_CORE
RN will assess for command auditory hallucination  RN will provide daily medication regimen  RN will offer PRN medication for worsening hallucinations  RN will ensure the enviroment is safe  RN will educate on coping skills/ encourage distractions to help manage auditory hallucination

## 2023-01-13 NOTE — BH INPATIENT PSYCHIATRY ASSESSMENT NOTE - NSBHASSESSSUMMFT_PSY_ALL_CORE
The patient is a 28 yo woman, domiciled w/ various family members, has 5 yo son, with no sig PMH, and psych hx of bipolar disorder, cannabis use disorder, PTSD, 3 prior hospitalizations (most recently 2y ago in [place]), non-adherent to outpatient care, who self-presents with disorganized behavior after first eloping during evaluation at [facility] yesterday 1/10, where there was concern that she was acutely manic.    On interview, the patient is a poor historian, unable to give a clear timeline of events and unable to clearly explain how she arrived at this ED today. Her speech is muffled, and tangential. Per triage note, patient presented as a walk-in. She seems to indicate now that she had waved down the police, says she stated she wanted to be somewhere warm, and they dropped her off at the hospital. The patient states she initially presented to the crisis center at the behest of her mother for a "mental health evaluation" but cannot specify what the concerns were. She describes being very stressed and overwhelmed juggling two jobs recently but otherwise denies psychiatric concerns. She says her mood has been "really happy." She denies any SI, denies AVH or paranoia.    On reassessment today, patient endorses hx consistent with bipolar disorder and endorses significant marijuana use and anxiety.     Impression:  - bipolar disorder, susanna   - cannabis-induced mood disorder (anxiety)   - possible anxiety disorder   - possible PTSD per hx     Plan:   #bipolar disorder  - lithium 300 mg q12h (started 1/13)  - risperidone 1mg bid (started 1/12)     #anxiety   - hydroxyzine hydrochloride 50 mg q6h PRN anxiety  - lorazepam 2mg q6h PRN Anxiety    #PRNs  - acetaminophen 650mg q6h PRN for pain   - haloperidol 5mg q6h PRN for agitation   - melatonin 5mg PO bedtime PRN Insomnia   The patient is a 26 yo woman, domiciled w/ various family members, has 5 yo son, with no sig PMH, and psych hx of bipolar disorder, cannabis use disorder, PTSD, 3 prior hospitalizations (most recently 2y ago in [place]), non-adherent to outpatient care, who self-presents with disorganized behavior after first eloping during evaluation at [facility] yesterday 1/10, where there was concern that she was acutely manic.    On interview, the patient is a poor historian, unable to give a clear timeline of events and unable to clearly explain how she arrived at this ED today. Her speech is muffled, and tangential. Per triage note, patient presented as a walk-in. She seems to indicate now that she had waved down the police, says she stated she wanted to be somewhere warm, and they dropped her off at the hospital. The patient states she initially presented to the crisis center at the behest of her mother for a "mental health evaluation" but cannot specify what the concerns were. She describes being very stressed and overwhelmed juggling two jobs recently but otherwise denies psychiatric concerns. She says her mood has been "really happy." She denies any SI, denies AVH or paranoia.    On assessment today, patient endorses hx consistent with bipolar disorder (euphoria, grandiosity, shows flight of ideas, reports increased goal-directed activity: walked 18miles), reports decreased sleep need, and shows talkativeness/pressured speech).  Patient also endorses significant marijuana use and anxiety.     Impression:  - bipolar disorder, susanna   - cannabis-induced mood disorder (anxiety)   - possible anxiety disorder   - possible PTSD per hx     Plan:   #admission  - admit 9.27 [facility] transfer   - f/u CBC, CMP, TSH, lipid panel, a1c ordered (for AM)      #bipolar disorder  - lithium 300 mg q12h (started 1/13)  - risperidone 1mg bid (started 1/12)     #anxiety   - hydroxyzine hydrochloride 50 mg q6h PRN anxiety  - lorazepam 2mg q6h PRN Anxiety    #PRNs  - acetaminophen 650mg q6h PRN for pain   - haloperidol 5mg q6h PRN for agitation   - melatonin 5mg PO bedtime PRN Insomnia   The patient is a 26 yo woman, domiciled w/ various family members, has 3 yo son, with no sig PMH, and psych hx of bipolar disorder, cannabis use disorder, PTSD, 3 prior hospitalizations (most recently 2y ago in [place]), non-adherent to outpatient care, who self-presents with disorganized behavior after first eloping during evaluation at [facility] yesterday 1/10, where there was concern that she was acutely manic.    On interview, the patient is a poor historian, unable to give a clear timeline of events and unable to clearly explain how she arrived at this ED today. Her speech is muffled, and tangential. Per triage note, patient presented as a walk-in. She seems to indicate now that she had waved down the police, says she stated she wanted to be somewhere warm, and they dropped her off at the hospital. The patient states she initially presented to the crisis center at the behest of her mother for a "mental health evaluation" but cannot specify what the concerns were. She describes being very stressed and overwhelmed juggling two jobs recently but otherwise denies psychiatric concerns. She says her mood has been "really happy." She denies any SI, denies AVH or paranoia.    On assessment today, patient endorses hx consistent with bipolar disorder (euphoria, grandiosity, shows flight of ideas, reports increased goal-directed activity: walked 18miles), reports decreased sleep need, and shows talkativeness/pressured speech).  Patient also endorses significant marijuana use and anxiety.     Impression:  - bipolar disorder, susanna   - cannabis-induced mood disorder (anxiety)   - possible anxiety disorder   - possible PTSD per hx     Plan:   #admission  - admit 9.27 [facility] transfer   - f/u CBC, CMP, TSH, lipid panel, a1c ordered (for AM)      #bipolar disorder  - lithium 300 mg q12h (started 1/13)  - lithium levels ordered for Tues 1/17 AM  - risperidone 1mg bid (started 1/12)     #anxiety   - hydroxyzine hydrochloride 50 mg q6h PRN anxiety  - lorazepam 2mg q6h PRN Anxiety    #PRNs  - acetaminophen 650mg q6h PRN for pain   - haloperidol 5mg q6h PRN for agitation   - melatonin 5mg PO bedtime PRN Insomnia

## 2023-01-13 NOTE — BH INPATIENT PSYCHIATRY ASSESSMENT NOTE - PATIENT'S CHIEF COMPLAINT
****THIS IS AN INCOMPLETE NOTE. ****PLAN HAS NOT BEEN FINALIZED BY ATTENDING. ****FULL NOTE TO FOLLOW SHORTLY. ****  "I'm ready to go"

## 2023-01-13 NOTE — BH TREATMENT PLAN - NSTXPLANTHERAPYSESSIONSFT_PSY_ALL_CORE
Hydroxychloroquine Pregnancy And Lactation Text: This medication has been shown to cause fetal harm but it isn't assigned a Pregnancy Risk Category. There are small amounts excreted in breast milk.   01-12-23  Type of therapy: Coping skills  Type of session: Group  Level of patient participation: Engaged  Duration of participation: 15 minutes  Therapy conducted by: Social work  Therapy Summary: Group session facilitated by JACOB wallace. In a game-oriented setting, questions such as “What activities make you feel better?” and “I feel the most stressed when?” were explored to help increase feelings of self-efficacy through an interactive psychoeducational exercise.   Serena was an active and engaged participant in the group discussions promoted by the activity.    01-13-23  Type of therapy: Coping skills,Creative arts therapy,Inspiration and motiviation,Leisure development,Stress management  Type of session: Group  Level of patient participation: Participates  Duration of participation: 45 minutes  Therapy conducted by: Psych rehab  Therapy Summary: Pt attended RT session , engaged in creative arts , pt was social ,calm and appropriate and cooperative in session .    01-13-23  Type of therapy: Mindfulness  Type of session: Group  Level of patient participation: Participates  Duration of participation: 30 minutes  Therapy conducted by: Social work  Therapy Summary: Group session facilitated by JACOB wallace to explore the benefits of mindfulness and meditation. The “Leaves on a Stream”, a brief visual meditative practice was facilitated and encouraged as a grounding resource to use in the future to manage stress and remain in good behavioral control. The importance of acquiring skills such as mindfulness practices post discharge were encouraged and participants were able to identify tools they could use in the future when feeling dysregulated.     Serena was engaged and present during the group session and receives redirection well. Serena has fair insight surrounding her behavior and recognizes that group meetings are opportunities to practice mindfulness exercises and grounding techniques. Serena expresses interest in developing greater habits oriented to the explored skills.    01-13-23  Type of therapy: Other,8 DIMENSIONS OF WELLNESS  Type of session: Group  Level of patient participation: Attentive,Engaged,Participates  Duration of participation: 45 minutes  Therapy conducted by: Nursing  --    01-13-23  Type of therapy: Cognitive behavior therapy  Type of session: Group  Level of patient participation: Engaged  Duration of participation: 45 minutes  Therapy conducted by: Social work  Therapy Summary: Group session facilitated by JACOB wallace to assist in developing greater insight into cognitive distortions. Reflective questions were asked to prompt discussion and self-reflection. Through interactive group work participants gained greater insight into identifying how cognitive distortions impact behavior and relationships.     Serena was able to identify how to reframe cognitive distortions while developing greater insight into the importance of self-reflection. Serena was an active and engaged participant in the group exercise and discussion.

## 2023-01-13 NOTE — BH INPATIENT PSYCHIATRY ASSESSMENT NOTE - OTHER PAST PSYCHIATRIC HISTORY (INCLUDE DETAILS REGARDING ONSET, COURSE OF ILLNESS, INPATIENT/OUTPATIENT TREATMENT)
4 prior inpt psych admssions for bipolar disorder with psychosis  4 prior inpt psych admissions for bipolar disorder with psychosis

## 2023-01-13 NOTE — BH INPATIENT PSYCHIATRY ASSESSMENT NOTE - NSBHMETABOLIC_PSY_ALL_CORE_FT
BMI: BMI (kg/m2): 11.3 (01-12-23 @ 13:06)  HbA1c:   Glucose:   BP: 118/67 (01-13-23 @ 08:19) (118/67 - 140/99)  Lipid Panel:

## 2023-01-13 NOTE — UM REPORT PROGRESS NOTE - NSUMRMPROVIDER_GEN_A_CORE FT
Patient: Serena Swartz  : 1995  INSURANCE: [facility]  ID# [id number]   [phone]     23- Spoke to Britney Sharif # is pending [id number].  Authorization is pending, no fax is needed at this time. Awaiting call back from  to review clinicals.  Patient: Serena Swartz  : 1995  INSURANCE: [facility]  ID# [id number]   [phone]     23- Spoke to Britney Sharif # is pending [id number].  Authorization is pending, no fax is needed at this time. Awaiting call back from CM to review clinicals.     23- Spoke to Heather Sharif # [id number]. Patient is approved from -, review is due on  please call [phone] for additional days. No Cm is assigned as of now.

## 2023-01-14 LAB
A1C WITH ESTIMATED AVERAGE GLUCOSE RESULT: 5 % — SIGNIFICANT CHANGE UP (ref 4–5.6)
ALBUMIN SERPL ELPH-MCNC: 4.6 G/DL — SIGNIFICANT CHANGE UP (ref 3.5–5.2)
ALP SERPL-CCNC: 47 U/L — SIGNIFICANT CHANGE UP (ref 30–115)
ALT FLD-CCNC: 14 U/L — SIGNIFICANT CHANGE UP (ref 0–41)
ANION GAP SERPL CALC-SCNC: 9 MMOL/L — SIGNIFICANT CHANGE UP (ref 7–14)
AST SERPL-CCNC: 22 U/L — SIGNIFICANT CHANGE UP (ref 0–41)
BILIRUB SERPL-MCNC: 0.5 MG/DL — SIGNIFICANT CHANGE UP (ref 0.2–1.2)
BUN SERPL-MCNC: 9 MG/DL — LOW (ref 10–20)
CALCIUM SERPL-MCNC: 9.7 MG/DL — SIGNIFICANT CHANGE UP (ref 8.4–10.5)
CHLORIDE SERPL-SCNC: 104 MMOL/L — SIGNIFICANT CHANGE UP (ref 98–110)
CHOLEST SERPL-MCNC: 199 MG/DL — SIGNIFICANT CHANGE UP
CO2 SERPL-SCNC: 28 MMOL/L — SIGNIFICANT CHANGE UP (ref 17–32)
CREAT SERPL-MCNC: 0.9 MG/DL — SIGNIFICANT CHANGE UP (ref 0.7–1.5)
EGFR: 90 ML/MIN/1.73M2 — SIGNIFICANT CHANGE UP
ESTIMATED AVERAGE GLUCOSE: 97 MG/DL — SIGNIFICANT CHANGE UP (ref 68–114)
GLUCOSE SERPL-MCNC: 96 MG/DL — SIGNIFICANT CHANGE UP (ref 70–99)
HCT VFR BLD CALC: 39.5 % — SIGNIFICANT CHANGE UP (ref 37–47)
HDLC SERPL-MCNC: 90 MG/DL — SIGNIFICANT CHANGE UP
HGB BLD-MCNC: 12.8 G/DL — SIGNIFICANT CHANGE UP (ref 12–16)
LIPID PNL WITH DIRECT LDL SERPL: 100 MG/DL — HIGH
MCHC RBC-ENTMCNC: 26.8 PG — LOW (ref 27–31)
MCHC RBC-ENTMCNC: 32.4 G/DL — SIGNIFICANT CHANGE UP (ref 32–37)
MCV RBC AUTO: 82.8 FL — SIGNIFICANT CHANGE UP (ref 81–99)
NON HDL CHOLESTEROL: 109 MG/DL — SIGNIFICANT CHANGE UP
NRBC # BLD: 0 /100 WBCS — SIGNIFICANT CHANGE UP (ref 0–0)
PLATELET # BLD AUTO: 360 K/UL — SIGNIFICANT CHANGE UP (ref 130–400)
POTASSIUM SERPL-MCNC: 4.4 MMOL/L — SIGNIFICANT CHANGE UP (ref 3.5–5)
POTASSIUM SERPL-SCNC: 4.4 MMOL/L — SIGNIFICANT CHANGE UP (ref 3.5–5)
PROT SERPL-MCNC: 7.9 G/DL — SIGNIFICANT CHANGE UP (ref 6–8)
RBC # BLD: 4.77 M/UL — SIGNIFICANT CHANGE UP (ref 4.2–5.4)
RBC # FLD: 14.1 % — SIGNIFICANT CHANGE UP (ref 11.5–14.5)
SODIUM SERPL-SCNC: 141 MMOL/L — SIGNIFICANT CHANGE UP (ref 135–146)
TRIGL SERPL-MCNC: 44 MG/DL — SIGNIFICANT CHANGE UP
TSH SERPL-MCNC: 1.49 UIU/ML — SIGNIFICANT CHANGE UP (ref 0.27–4.2)
WBC # BLD: 5.8 K/UL — SIGNIFICANT CHANGE UP (ref 4.8–10.8)
WBC # FLD AUTO: 5.8 K/UL — SIGNIFICANT CHANGE UP (ref 4.8–10.8)

## 2023-01-14 PROCEDURE — 99231 SBSQ HOSP IP/OBS SF/LOW 25: CPT

## 2023-01-14 RX ADMIN — Medication 50 MILLIGRAM(S): at 13:03

## 2023-01-14 RX ADMIN — Medication 2 MILLIGRAM(S): at 23:22

## 2023-01-14 RX ADMIN — LITHIUM CARBONATE 300 MILLIGRAM(S): 300 TABLET, EXTENDED RELEASE ORAL at 08:06

## 2023-01-14 RX ADMIN — LITHIUM CARBONATE 300 MILLIGRAM(S): 300 TABLET, EXTENDED RELEASE ORAL at 20:27

## 2023-01-14 RX ADMIN — PANTOPRAZOLE SODIUM 40 MILLIGRAM(S): 20 TABLET, DELAYED RELEASE ORAL at 06:49

## 2023-01-14 RX ADMIN — Medication 50 MILLIGRAM(S): at 20:27

## 2023-01-14 RX ADMIN — RISPERIDONE 1 MILLIGRAM(S): 4 TABLET ORAL at 08:06

## 2023-01-14 RX ADMIN — Medication 2 MILLIGRAM(S): at 12:02

## 2023-01-14 RX ADMIN — Medication 50 MILLIGRAM(S): at 07:01

## 2023-01-14 RX ADMIN — RISPERIDONE 1 MILLIGRAM(S): 4 TABLET ORAL at 20:27

## 2023-01-14 NOTE — BH INPATIENT PSYCHIATRY PROGRESS NOTE - NSBHFUPINTERVALHXFT_PSY_A_CORE
Patient seen and interviewed .   She reports that she has no complaints except that her room is very cold and she is not supported to be here . patient was observed to be hyperverbal, overinclusive , difficult to redirect .   According to nursing staff , patient is intrusive , frequently requries PRN medication for anxiety but is otherwise in good behavioral control.

## 2023-01-14 NOTE — BH INPATIENT PSYCHIATRY PROGRESS NOTE - NSBHMETABOLIC_PSY_ALL_CORE_FT
BMI: BMI (kg/m2): 11.3 (01-12-23 @ 13:06)  HbA1c: A1C with Estimated Average Glucose Result: 5.0 % (01-14-23 @ 08:25)    Glucose:   BP: 151/100 (01-14-23 @ 15:43) (111/69 - 151/100)  Lipid Panel: Date/Time: 01-14-23 @ 08:25  Cholesterol, Serum: 199  Direct LDL: --  HDL Cholesterol, Serum: 90  Total Cholesterol/HDL Ration Measurement: --  Triglycerides, Serum: 44

## 2023-01-14 NOTE — BH INPATIENT PSYCHIATRY PROGRESS NOTE - NSBHCHARTREVIEWVS_PSY_A_CORE FT
Vital Signs Last 24 Hrs  T(C): 36.5 (01-14-23 @ 15:43), Max: 36.5 (01-14-23 @ 15:43)  T(F): 97.7 (01-14-23 @ 15:43), Max: 97.7 (01-14-23 @ 15:43)  HR: 110 (01-14-23 @ 15:43) (106 - 110)  BP: 151/100 (01-14-23 @ 15:43) (128/88 - 151/100)  BP(mean): --  RR: 20 (01-14-23 @ 15:43) (20 - 20)  SpO2: --

## 2023-01-14 NOTE — BH INPATIENT PSYCHIATRY PROGRESS NOTE - NSBHASSESSSUMMFT_PSY_ALL_CORE
Ms Swartz is a 27 year old woman with a history of Bipolar disorder who was admitted to the inpatient psychiatric floor for the management of susanna.   Patient continues to be hyperverbal, labile in mood , intrusive , with some anxiety  and poor insight into her illness .   At this time, patient is considered a danger to himself and others and needs inpatient psychiatric hospitalization for medication management and symptoms stabilization.        Plan   Continue lithium 300mg p.O BID   Follow Lithium level in on 1/17  Continue Risperdal 1mg P.O BID

## 2023-01-14 NOTE — BH INPATIENT PSYCHIATRY PROGRESS NOTE - CURRENT MEDICATION
MEDICATIONS  (STANDING):  lithium 300 milliGRAM(s) Oral every 12 hours  pantoprazole    Tablet 40 milliGRAM(s) Oral before breakfast  risperiDONE   Tablet 1 milliGRAM(s) Oral two times a day    MEDICATIONS  (PRN):  acetaminophen     Tablet .. 650 milliGRAM(s) Oral every 6 hours PRN Mild Pain (1 - 3), Moderate Pain (4 - 6), Severe Pain (7 - 10)  aluminum hydroxide/magnesium hydroxide/simethicone Suspension 30 milliLiter(s) Oral every 4 hours PRN Dyspepsia  haloperidol     Tablet 5 milliGRAM(s) Oral every 6 hours PRN agitation  hydrOXYzine hydrochloride 50 milliGRAM(s) Oral every 6 hours PRN anxiety  LORazepam     Tablet 2 milliGRAM(s) Oral every 6 hours PRN Anxiety  melatonin. 5 milliGRAM(s) Oral at bedtime PRN Insomnia

## 2023-01-15 RX ORDER — HYDROCORTISONE 1 %
1 OINTMENT (GRAM) TOPICAL DAILY
Refills: 0 | Status: COMPLETED | OUTPATIENT
Start: 2023-01-15 | End: 2023-01-18

## 2023-01-15 RX ADMIN — RISPERIDONE 1 MILLIGRAM(S): 4 TABLET ORAL at 07:37

## 2023-01-15 RX ADMIN — Medication 5 MILLIGRAM(S): at 20:41

## 2023-01-15 RX ADMIN — Medication 2 MILLIGRAM(S): at 20:37

## 2023-01-15 RX ADMIN — Medication 50 MILLIGRAM(S): at 03:31

## 2023-01-15 RX ADMIN — Medication 50 MILLIGRAM(S): at 16:51

## 2023-01-15 RX ADMIN — RISPERIDONE 1 MILLIGRAM(S): 4 TABLET ORAL at 20:01

## 2023-01-15 RX ADMIN — LITHIUM CARBONATE 300 MILLIGRAM(S): 300 TABLET, EXTENDED RELEASE ORAL at 07:37

## 2023-01-15 RX ADMIN — PANTOPRAZOLE SODIUM 40 MILLIGRAM(S): 20 TABLET, DELAYED RELEASE ORAL at 06:34

## 2023-01-15 RX ADMIN — Medication 650 MILLIGRAM(S): at 07:16

## 2023-01-15 RX ADMIN — LITHIUM CARBONATE 300 MILLIGRAM(S): 300 TABLET, EXTENDED RELEASE ORAL at 20:01

## 2023-01-15 RX ADMIN — Medication 650 MILLIGRAM(S): at 06:34

## 2023-01-15 RX ADMIN — Medication 2 MILLIGRAM(S): at 07:37

## 2023-01-15 NOTE — BH INPATIENT PSYCHIATRY PROGRESS NOTE - NSBHCHARTREVIEWVS_PSY_A_CORE FT
Vital Signs Last 24 Hrs  T(C): 37.2 (01-15-23 @ 08:26), Max: 37.2 (01-15-23 @ 08:26)  T(F): 99 (01-15-23 @ 08:26), Max: 99 (01-15-23 @ 08:26)  HR: 101 (01-15-23 @ 08:26) (101 - 111)  BP: 117/86 (01-15-23 @ 08:26) (117/86 - 151/100)  BP(mean): --  RR: 18 (01-15-23 @ 08:26) (18 - 20)  SpO2: --

## 2023-01-15 NOTE — BH INPATIENT PSYCHIATRY PROGRESS NOTE - NSBHFUPINTERVALHXFT_PSY_A_CORE
Pt was seen and evaluated. Chart reviewed. No events overnight. On evaluation, pt reports she is "alright." Complained of trouble sleeping due to being "cold" but states she is able to "advocate" for self. Reports she was able to get a lot of blankets and reports that the anxiety medication has been helpful. Reports fair appetite. Visible on the unit. Denied A/ V hallucinations. Denied suicidal/homicidal ideation, intent or plan.

## 2023-01-15 NOTE — BH INPATIENT PSYCHIATRY PROGRESS NOTE - NSBHMETABOLIC_PSY_ALL_CORE_FT
BMI: BMI (kg/m2): 11.3 (01-12-23 @ 13:06)  HbA1c: A1C with Estimated Average Glucose Result: 5.0 % (01-14-23 @ 08:25)    Glucose:   BP: 117/86 (01-15-23 @ 08:26) (111/69 - 151/100)  Lipid Panel: Date/Time: 01-14-23 @ 08:25  Cholesterol, Serum: 199  Direct LDL: --  HDL Cholesterol, Serum: 90  Total Cholesterol/HDL Ration Measurement: --  Triglycerides, Serum: 44

## 2023-01-16 PROCEDURE — 99231 SBSQ HOSP IP/OBS SF/LOW 25: CPT

## 2023-01-16 RX ORDER — SODIUM CHLORIDE 0.65 %
1 AEROSOL, SPRAY (ML) NASAL EVERY 4 HOURS
Refills: 0 | Status: DISCONTINUED | OUTPATIENT
Start: 2023-01-16 | End: 2023-01-20

## 2023-01-16 RX ORDER — POLYETHYLENE GLYCOL 3350 17 G/17G
17 POWDER, FOR SOLUTION ORAL ONCE
Refills: 0 | Status: COMPLETED | OUTPATIENT
Start: 2023-01-16 | End: 2023-01-16

## 2023-01-16 RX ADMIN — Medication 50 MILLIGRAM(S): at 03:06

## 2023-01-16 RX ADMIN — LITHIUM CARBONATE 300 MILLIGRAM(S): 300 TABLET, EXTENDED RELEASE ORAL at 20:17

## 2023-01-16 RX ADMIN — Medication 1 SPRAY(S): at 04:27

## 2023-01-16 RX ADMIN — Medication 50 MILLIGRAM(S): at 13:58

## 2023-01-16 RX ADMIN — Medication 5 MILLIGRAM(S): at 20:22

## 2023-01-16 RX ADMIN — Medication 2 MILLIGRAM(S): at 04:48

## 2023-01-16 RX ADMIN — POLYETHYLENE GLYCOL 3350 17 GRAM(S): 17 POWDER, FOR SOLUTION ORAL at 04:27

## 2023-01-16 RX ADMIN — Medication 1 MILLIGRAM(S): at 18:41

## 2023-01-16 RX ADMIN — RISPERIDONE 1 MILLIGRAM(S): 4 TABLET ORAL at 08:11

## 2023-01-16 RX ADMIN — LITHIUM CARBONATE 300 MILLIGRAM(S): 300 TABLET, EXTENDED RELEASE ORAL at 08:11

## 2023-01-16 RX ADMIN — RISPERIDONE 1 MILLIGRAM(S): 4 TABLET ORAL at 20:17

## 2023-01-16 RX ADMIN — PANTOPRAZOLE SODIUM 40 MILLIGRAM(S): 20 TABLET, DELAYED RELEASE ORAL at 08:11

## 2023-01-16 NOTE — BH INPATIENT PSYCHIATRY PROGRESS NOTE - NSBHMETABOLIC_PSY_ALL_CORE_FT
BMI: BMI (kg/m2): 11.3 (01-12-23 @ 13:06)  HbA1c: A1C with Estimated Average Glucose Result: 5.0 % (01-14-23 @ 08:25)    Glucose:   BP: 119/76 (01-16-23 @ 05:07) (111/69 - 151/100)  Lipid Panel: Date/Time: 01-14-23 @ 08:25  Cholesterol, Serum: 199  Direct LDL: --  HDL Cholesterol, Serum: 90  Total Cholesterol/HDL Ration Measurement: --  Triglycerides, Serum: 44

## 2023-01-16 NOTE — BH INPATIENT PSYCHIATRY PROGRESS NOTE - NSBHCHARTREVIEWVS_PSY_A_CORE FT
Vital Signs Last 24 Hrs  T(C): 36.4 (16 Jan 2023 05:07), Max: 36.4 (15 Manuel 2023 16:11)  T(F): 97.5 (16 Jan 2023 05:07), Max: 97.6 (15 Manuel 2023 16:11)  HR: 115 (16 Jan 2023 05:07) (82 - 115)  BP: 119/76 (16 Jan 2023 05:07) (119/76 - 125/66)  BP(mean): --  RR: 20 (16 Jan 2023 05:07) (16 - 20)  SpO2: --
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
## 2023-01-16 NOTE — CHART NOTE - NSCHARTNOTEFT_GEN_A_CORE
Was alerted by nurse, pt is anxious, will give 1 dose of 2mg ativan PO. Psych /primary will need to evaluate pt for PRN dosage.
Reviewed iSTOP (Reference #: [id number]) - indicates no controlled substances prescribed in the past 12 months

## 2023-01-16 NOTE — BH INPATIENT PSYCHIATRY PROGRESS NOTE - CURRENT MEDICATION
MEDICATIONS  (STANDING):  lithium 300 milliGRAM(s) Oral every 12 hours  pantoprazole    Tablet 40 milliGRAM(s) Oral before breakfast  risperiDONE   Tablet 1 milliGRAM(s) Oral two times a day    MEDICATIONS  (PRN):  acetaminophen     Tablet .. 650 milliGRAM(s) Oral every 6 hours PRN Mild Pain (1 - 3), Moderate Pain (4 - 6), Severe Pain (7 - 10)  aluminum hydroxide/magnesium hydroxide/simethicone Suspension 30 milliLiter(s) Oral every 4 hours PRN Dyspepsia  haloperidol     Tablet 5 milliGRAM(s) Oral every 6 hours PRN agitation  hydrocortisone hemorrhoidal Suppository 1 Suppository(s) Rectal daily PRN hemorrhoid sx  hydrOXYzine hydrochloride 50 milliGRAM(s) Oral every 6 hours PRN anxiety  melatonin. 5 milliGRAM(s) Oral at bedtime PRN Insomnia  sodium chloride 0.65% Nasal 1 Spray(s) Both Nostrils every 4 hours PRN stuffiness

## 2023-01-17 LAB
APPEARANCE UR: CLEAR — SIGNIFICANT CHANGE UP
BACTERIA # UR AUTO: ABNORMAL
BILIRUB UR-MCNC: NEGATIVE — SIGNIFICANT CHANGE UP
COLOR SPEC: YELLOW — SIGNIFICANT CHANGE UP
DIFF PNL FLD: ABNORMAL
EPI CELLS # UR: ABNORMAL /HPF
GLUCOSE UR QL: NEGATIVE MG/DL — SIGNIFICANT CHANGE UP
HCG UR QL: NEGATIVE — SIGNIFICANT CHANGE UP
KETONES UR-MCNC: NEGATIVE — SIGNIFICANT CHANGE UP
LEUKOCYTE ESTERASE UR-ACNC: ABNORMAL
NITRITE UR-MCNC: NEGATIVE — SIGNIFICANT CHANGE UP
PH UR: 7.5 — SIGNIFICANT CHANGE UP (ref 5–8)
PROT UR-MCNC: NEGATIVE MG/DL — SIGNIFICANT CHANGE UP
RBC CASTS # UR COMP ASSIST: ABNORMAL /HPF
SP GR SPEC: 1.02 — SIGNIFICANT CHANGE UP (ref 1.01–1.03)
UROBILINOGEN FLD QL: 0.2 MG/DL — SIGNIFICANT CHANGE UP
WBC UR QL: ABNORMAL /HPF

## 2023-01-17 PROCEDURE — 99231 SBSQ HOSP IP/OBS SF/LOW 25: CPT

## 2023-01-17 RX ADMIN — Medication 50 MILLIGRAM(S): at 08:12

## 2023-01-17 RX ADMIN — Medication 5 MILLIGRAM(S): at 20:48

## 2023-01-17 RX ADMIN — Medication 650 MILLIGRAM(S): at 08:45

## 2023-01-17 RX ADMIN — LITHIUM CARBONATE 300 MILLIGRAM(S): 300 TABLET, EXTENDED RELEASE ORAL at 08:43

## 2023-01-17 RX ADMIN — Medication 50 MILLIGRAM(S): at 01:48

## 2023-01-17 RX ADMIN — Medication 1 MILLIGRAM(S): at 14:47

## 2023-01-17 RX ADMIN — RISPERIDONE 1 MILLIGRAM(S): 4 TABLET ORAL at 20:49

## 2023-01-17 RX ADMIN — Medication 50 MILLIGRAM(S): at 20:48

## 2023-01-17 RX ADMIN — PANTOPRAZOLE SODIUM 40 MILLIGRAM(S): 20 TABLET, DELAYED RELEASE ORAL at 08:43

## 2023-01-17 RX ADMIN — LITHIUM CARBONATE 300 MILLIGRAM(S): 300 TABLET, EXTENDED RELEASE ORAL at 20:49

## 2023-01-17 RX ADMIN — RISPERIDONE 1 MILLIGRAM(S): 4 TABLET ORAL at 08:43

## 2023-01-17 RX ADMIN — Medication 650 MILLIGRAM(S): at 09:15

## 2023-01-17 NOTE — BH INPATIENT PSYCHIATRY PROGRESS NOTE - NSBHFUPINTERVALHXFT_PSY_A_CORE
****THIS IS AN INCOMPLETE NOTE. ****PLAN HAS NOT BEEN FINALIZED BY ATTENDING. ****FULL NOTE TO FOLLOW SHORTLY. ****  Nursing reports no acute events overnight. Per nursing, pt has been concerned about possible UTI, but had been refusing UA and UCx.    Chart reviewed, patient seen and evaluated in AM. Patient hypomanic on interview (hyperverbal, loose associations, elevated mood), and discharge-oriented. On approach, patient reports that prior to her hospitalization, she was reportedly found with "urea plasma," which patient indicated was reportedly possibly a UTI, and was reportedly prescribed doxycycline BID, but only took the medications once a day since she reports she didn't understand the instructions, and states that she stopped taking the medications prior to her hospitalization due to GI side effects. Discussed that patient would benefit from a repeat urine       Patient reports no suicidal ideation, intent or plan. Denied auditory or visual hallucinations. Denied paranoia. Patient compliant with medications; reports no side effects of medications.

## 2023-01-17 NOTE — BH INPATIENT PSYCHIATRY PROGRESS NOTE - NSBHCHARTREVIEWVS_PSY_A_CORE FT
Vital Signs Last 24 Hrs  T(C): 35.8 (01-17-23 @ 08:33), Max: 36.5 (01-16-23 @ 16:03)  T(F): 96.4 (01-17-23 @ 08:33), Max: 97.7 (01-16-23 @ 16:03)  HR: 109 (01-17-23 @ 08:33) (93 - 109)  BP: 126/67 (01-17-23 @ 08:33) (109/85 - 126/67)  BP(mean): --  RR: 18 (01-17-23 @ 08:33) (16 - 18)  SpO2: --

## 2023-01-17 NOTE — BH SAFETY PLAN - INTERNAL COPING STRATEGY 1
Detail Level: Simple
Detail Level: Zone
Detail Level: Detailed
One of my coping skills is I may read a book to distract myself.

## 2023-01-17 NOTE — BH INPATIENT PSYCHIATRY PROGRESS NOTE - NSBHMETABOLIC_PSY_ALL_CORE_FT
BMI: BMI (kg/m2): 11.3 (01-12-23 @ 13:06)  HbA1c: A1C with Estimated Average Glucose Result: 5.0 % (01-14-23 @ 08:25)    Glucose:   BP: 126/67 (01-17-23 @ 08:33) (109/85 - 151/100)  Lipid Panel: Date/Time: 01-14-23 @ 08:25  Cholesterol, Serum: 199  Direct LDL: --  HDL Cholesterol, Serum: 90  Total Cholesterol/HDL Ration Measurement: --  Triglycerides, Serum: 44

## 2023-01-17 NOTE — BH INPATIENT PSYCHIATRY PROGRESS NOTE - NSBHASSESSSUMMFT_PSY_ALL_CORE
Ms Swartz is a 27 year old woman with a history of Bipolar disorder who was admitted to the inpatient psychiatric floor for the management of susanna.   Patient continues to be hyperverbal, labile in mood , intrusive , with some anxiety  and poor insight into her illness .   At this time, patient is considered a danger to himself and others and needs inpatient psychiatric hospitalization for medication management and symptoms stabilization.        Plan   Continue lithium 300mg p.O BID   Follow Lithium level in on 1/17  Continue Risperdal 1mg P.O BID Ms Swartz is a 27 year old woman with a history of Bipolar disorder who was admitted to the inpatient psychiatric floor for the management of susanna.   Patient continues to be hyperverbal, labile in mood , intrusive , with some anxiety  and poor insight into her illness .   At this time, patient is considered a danger to himself and others and needs inpatient psychiatric hospitalization for medication management and symptoms stabilization.      On interview, the patient is a poor historian, unable to give a clear timeline of events and unable to clearly explain how she arrived at this ED today. Her speech is muffled, and tangential. Per triage note, patient presented as a walk-in. She seems to indicate now that she had waved down the police, says she stated she wanted to be somewhere warm, and they dropped her off at the hospital. The patient states she initially presented to the crisis center at the behest of her mother for a "mental health evaluation" but cannot specify what the concerns were. She describes being very stressed and overwhelmed juggling two jobs recently but otherwise denies psychiatric concerns. She says her mood has been "really happy." She denies any SI, denies AVH or paranoia.    On assessment today, patient endorses hx consistent with bipolar disorder (euphoria, grandiosity, shows flight of ideas, reports increased goal-directed activity: walked 18miles), reports decreased sleep need, and shows talkativeness/pressured speech).  Patient also endorses significant marijuana use and anxiety.     Impression:  - bipolar disorder, susanna   - cannabis-induced mood disorder (anxiety)   - possible anxiety disorder   - possible PTSD per hx     Plan:   #?UTI, pt endorses she was on doxycycline for "urea plasma" but that med was not tolerated due to GI side effects. Denies dysuria.   - f/u UA, UCx     #bipolar disorder  - lithium 300 mg q12h (started 1/13)  - lithium levels ordered for Tues 1/17 AM; pending collection   - risperidone 1mg bid (started 1/12)     #anxiety   - hydroxyzine hydrochloride 50 mg q6h PRN anxiety  - lorazepam 2mg q6h PRN Anxiety    #PRNs  - acetaminophen 650mg q6h PRN for pain   - haloperidol 5mg q6h PRN for agitation   - melatonin 5mg PO bedtime PRN Insomnia  i

## 2023-01-17 NOTE — BH SAFETY PLAN - ENVIRONMENT SAFETY 1:
I feel my environment would be safer going forward if I make sure that I get on an regular sleep and eating schedule.

## 2023-01-17 NOTE — BH INPATIENT PSYCHIATRY PROGRESS NOTE - CURRENT MEDICATION
MEDICATIONS  (STANDING):  lithium 300 milliGRAM(s) Oral every 12 hours  pantoprazole    Tablet 40 milliGRAM(s) Oral before breakfast  risperiDONE   Tablet 1 milliGRAM(s) Oral two times a day    MEDICATIONS  (PRN):  acetaminophen     Tablet .. 650 milliGRAM(s) Oral every 6 hours PRN Mild Pain (1 - 3), Moderate Pain (4 - 6), Severe Pain (7 - 10)  aluminum hydroxide/magnesium hydroxide/simethicone Suspension 30 milliLiter(s) Oral every 4 hours PRN Dyspepsia  haloperidol     Tablet 5 milliGRAM(s) Oral every 6 hours PRN agitation  hydrocortisone hemorrhoidal Suppository 1 Suppository(s) Rectal daily PRN hemorrhoid sx  hydrOXYzine hydrochloride 50 milliGRAM(s) Oral every 6 hours PRN anxiety  LORazepam     Tablet 1 milliGRAM(s) Oral two times a day PRN Anxiety/Insomnia  melatonin. 5 milliGRAM(s) Oral at bedtime PRN Insomnia  sodium chloride 0.65% Nasal 1 Spray(s) Both Nostrils every 4 hours PRN stuffiness

## 2023-01-18 LAB
CULTURE RESULTS: SIGNIFICANT CHANGE UP
SPECIMEN SOURCE: SIGNIFICANT CHANGE UP

## 2023-01-18 PROCEDURE — 99231 SBSQ HOSP IP/OBS SF/LOW 25: CPT

## 2023-01-18 RX ORDER — TRAZODONE HCL 50 MG
50 TABLET ORAL AT BEDTIME
Refills: 0 | Status: DISCONTINUED | OUTPATIENT
Start: 2023-01-18 | End: 2023-01-20

## 2023-01-18 RX ADMIN — Medication 1 MILLIGRAM(S): at 02:00

## 2023-01-18 RX ADMIN — PANTOPRAZOLE SODIUM 40 MILLIGRAM(S): 20 TABLET, DELAYED RELEASE ORAL at 08:28

## 2023-01-18 RX ADMIN — Medication 50 MILLIGRAM(S): at 20:10

## 2023-01-18 RX ADMIN — LITHIUM CARBONATE 300 MILLIGRAM(S): 300 TABLET, EXTENDED RELEASE ORAL at 08:28

## 2023-01-18 RX ADMIN — RISPERIDONE 1 MILLIGRAM(S): 4 TABLET ORAL at 20:06

## 2023-01-18 RX ADMIN — RISPERIDONE 1 MILLIGRAM(S): 4 TABLET ORAL at 08:28

## 2023-01-18 RX ADMIN — Medication 30 MILLILITER(S): at 02:00

## 2023-01-18 RX ADMIN — Medication 1 SUPPOSITORY(S): at 14:05

## 2023-01-18 RX ADMIN — Medication 50 MILLIGRAM(S): at 10:34

## 2023-01-18 RX ADMIN — Medication 1 MILLIGRAM(S): at 14:37

## 2023-01-18 RX ADMIN — Medication 50 MILLIGRAM(S): at 22:26

## 2023-01-18 RX ADMIN — LITHIUM CARBONATE 300 MILLIGRAM(S): 300 TABLET, EXTENDED RELEASE ORAL at 20:06

## 2023-01-18 NOTE — BH INPATIENT PSYCHIATRY PROGRESS NOTE - NSBHMETABOLIC_PSY_ALL_CORE_FT
BMI: BMI (kg/m2): 11.3 (01-12-23 @ 13:06)  HbA1c: A1C with Estimated Average Glucose Result: 5.0 % (01-14-23 @ 08:25)    Glucose:   BP: 128/75 (01-18-23 @ 15:59) (109/85 - 163/86)  Lipid Panel: Date/Time: 01-14-23 @ 08:25  Cholesterol, Serum: 199  Direct LDL: --  HDL Cholesterol, Serum: 90  Total Cholesterol/HDL Ration Measurement: --  Triglycerides, Serum: 44

## 2023-01-18 NOTE — BH INPATIENT PSYCHIATRY PROGRESS NOTE - NSBHCHARTREVIEWVS_PSY_A_CORE FT
Vital Signs Last 24 Hrs  T(C): 36.2 (18 Jan 2023 05:30), Max: 36.2 (18 Jan 2023 05:30)  T(F): 97.2 (18 Jan 2023 05:30), Max: 97.2 (18 Jan 2023 05:30)  HR: 95 (18 Jan 2023 15:59) (94 - 106)  BP: 128/75 (18 Jan 2023 15:59) (128/75 - 163/86)  BP(mean): --  RR: 16 (18 Jan 2023 15:59) (16 - 20)  SpO2: --

## 2023-01-18 NOTE — BH INPATIENT PSYCHIATRY PROGRESS NOTE - NSBHATTESTBILLING_PSY_A_CORE
07091-Fxvzofiwra OBS or IP - low complexity OR 25-34 mins
71541-Ievdnxdkll OBS or IP - low complexity OR 25-34 mins
20491-Rvphcoyffk OBS or IP - low complexity OR 25-34 mins
45434-Xnwrjsbmhy OBS or IP - low complexity OR 25-34 mins
97228-Ekycrphmuh OBS or IP - low complexity OR 25-34 mins

## 2023-01-18 NOTE — BH INPATIENT PSYCHIATRY PROGRESS NOTE - OTHER
deferred   She is preoccupied with what she identifies as bad treatment by others, primarily staff and also  is somewhat preoccupied with her difficulties with menstruation and hemorrhoids, the particulars of which she freely talks about no matter who she is speaking to.   She is able to say that she has a psychiatric disorder, bipolar disorder however has very little since of her own symptomatology and how her condition impacts on her socially.   Social judgment is questionable but she is able to describe how to safely manage situations of potential danger in an urban environment.   Despite complaining about not having been offered good treatment she tends to refuse much of what is offered. Varies from rapid to mildly pressured but no actual loose associations word salad etc.etc. she is capable of being stopped during conversation and to engage in reciprocal conversation.  impulse control has been episodically impaired but not dangerously so.

## 2023-01-18 NOTE — BH INPATIENT PSYCHIATRY PROGRESS NOTE - CURRENT MEDICATION
MEDICATIONS  (STANDING):  lithium 300 milliGRAM(s) Oral every 12 hours  pantoprazole    Tablet 40 milliGRAM(s) Oral before breakfast  risperiDONE   Tablet 1 milliGRAM(s) Oral two times a day    MEDICATIONS  (PRN):  acetaminophen     Tablet .. 650 milliGRAM(s) Oral every 6 hours PRN Mild Pain (1 - 3), Moderate Pain (4 - 6), Severe Pain (7 - 10)  aluminum hydroxide/magnesium hydroxide/simethicone Suspension 30 milliLiter(s) Oral every 4 hours PRN Dyspepsia  haloperidol     Tablet 5 milliGRAM(s) Oral every 6 hours PRN agitation  hydrOXYzine hydrochloride 50 milliGRAM(s) Oral every 6 hours PRN anxiety  LORazepam     Tablet 1 milliGRAM(s) Oral two times a day PRN Anxiety/Insomnia  melatonin. 5 milliGRAM(s) Oral at bedtime PRN Insomnia  sodium chloride 0.65% Nasal 1 Spray(s) Both Nostrils every 4 hours PRN stuffiness

## 2023-01-18 NOTE — BH INPATIENT PSYCHIATRY PROGRESS NOTE - NSBHASSESSSUMMFT_PSY_ALL_CORE
Ms Swartz is a 27 year old woman with a history of Bipolar disorder who was admitted to the inpatient psychiatric floor for the management of susanna.   Patient continues to be hyperverbal, labile in mood , intrusive , with some anxiety  and poor insight into her illness .   At this time, patient is considered a danger to himself and others and needs inpatient psychiatric hospitalization for medication management and symptoms stabilization.      On interview, the patient is a poor historian, unable to give a clear timeline of events and unable to clearly explain how she arrived at this ED today. Her speech is muffled, and tangential. Per triage note, patient presented as a walk-in. She seems to indicate now that she had waved down the police, says she stated she wanted to be somewhere warm, and they dropped her off at the hospital. The patient states she initially presented to the crisis center at the behest of her mother for a "mental health evaluation" but cannot specify what the concerns were. She describes being very stressed and overwhelmed juggling two jobs recently but otherwise denies psychiatric concerns. She says her mood has been "really happy." She denies any SI, denies AVH or paranoia.    On assessment today, patient endorses hx consistent with bipolar disorder (euphoria, grandiosity, shows flight of ideas, reports increased goal-directed activity: walked 18miles), reports decreased sleep need, and shows talkativeness/pressured speech).  Patient also endorses significant marijuana use and anxiety.     1-17–-23  patient is pressing hard to leave and is in some ways uncooperative with tests and interventions necessary for her release.  She is very preoccupied with telling people what bad treatment she is receiving.    Impression:  - bipolar disorder, susanna   - cannabis-induced mood disorder (anxiety)   - possible anxiety disorder   - possible PTSD per hx     Plan:   #?UTI, pt endorses she was on doxycycline for "urea plasma" but that med was not tolerated due to GI side effects. Denies dysuria.   - f/u UA, UCx     #bipolar disorder  - lithium 300 mg q12h (started 1/13)  - lithium levels ordered for Tues 1/17 AM; pending collection   - risperidone 1mg bid (started 1/12)     #anxiety   - hydroxyzine hydrochloride 50 mg q6h PRN anxiety  - lorazepam 2mg q6h PRN Anxiety    #PRNs  - acetaminophen 650mg q6h PRN for pain   - haloperidol 5mg q6h PRN for agitation   - melatonin 5mg PO bedtime PRN Insomnia  i

## 2023-01-18 NOTE — BH INPATIENT PSYCHIATRY PROGRESS NOTE - NSBHFUPINTERVALHXFT_PSY_A_CORE
Nursing reports no acute events overnight. Per nursing, pt has been concerned about possible UTI,  and after refusing it initially she gave a urine sample which is being analyzed.    Chart reviewed, patient seen and evaluated in AM.   Patient remains with pressured speech at times but clearly able to control it and converse.  She is somewhat expansive but expresses no actual delusions.  She can be overinclusive and tangential and gives a fairly nonstop monologue describing her bed treatment here.  By the end of the interview she agreed to provide cooperation for a lithium level to be drawn tomorrow morning and continues to press to be released.  She is extremely talkative and engaging other patients frequently but not awkwardly intrusive.  She continues to sell no evidence of combative violent homicidal suicidal or self-destructive ideas.  Patient reports no suicidal ideation, intent or plan. Denied auditory or visual hallucinations. Denied paranoia. Patient compliant with medications; reports no side effects of medications.

## 2023-01-19 LAB — LITHIUM SERPL-MCNC: 0.6 MMOL/L — SIGNIFICANT CHANGE UP (ref 0.6–1.2)

## 2023-01-19 RX ORDER — PANTOPRAZOLE SODIUM 20 MG/1
1 TABLET, DELAYED RELEASE ORAL
Qty: 0 | Refills: 0 | DISCHARGE
Start: 2023-01-19

## 2023-01-19 RX ORDER — TRAZODONE HCL 50 MG
1 TABLET ORAL
Qty: 14 | Refills: 0
Start: 2023-01-19 | End: 2023-02-01

## 2023-01-19 RX ORDER — LITHIUM CARBONATE 300 MG/1
1 TABLET, EXTENDED RELEASE ORAL
Qty: 0 | Refills: 0 | DISCHARGE
Start: 2023-01-19

## 2023-01-19 RX ORDER — PANTOPRAZOLE SODIUM 20 MG/1
1 TABLET, DELAYED RELEASE ORAL
Qty: 14 | Refills: 0
Start: 2023-01-19 | End: 2023-02-01

## 2023-01-19 RX ORDER — RISPERIDONE 4 MG/1
2 TABLET ORAL
Refills: 0 | Status: DISCONTINUED | OUTPATIENT
Start: 2023-01-19 | End: 2023-01-20

## 2023-01-19 RX ORDER — LITHIUM CARBONATE 300 MG/1
1 TABLET, EXTENDED RELEASE ORAL
Qty: 28 | Refills: 0
Start: 2023-01-19 | End: 2023-02-01

## 2023-01-19 RX ORDER — LANOLIN ALCOHOL/MO/W.PET/CERES
1 CREAM (GRAM) TOPICAL
Qty: 14 | Refills: 0
Start: 2023-01-19 | End: 2023-02-01

## 2023-01-19 RX ORDER — TRAZODONE HCL 50 MG
1 TABLET ORAL
Qty: 0 | Refills: 0 | DISCHARGE
Start: 2023-01-19

## 2023-01-19 RX ORDER — LANOLIN ALCOHOL/MO/W.PET/CERES
1 CREAM (GRAM) TOPICAL
Qty: 0 | Refills: 0 | DISCHARGE
Start: 2023-01-19

## 2023-01-19 RX ORDER — RISPERIDONE 4 MG/1
1 TABLET ORAL
Qty: 0 | Refills: 0 | DISCHARGE
Start: 2023-01-19

## 2023-01-19 RX ORDER — RISPERIDONE 4 MG/1
1 TABLET ORAL
Qty: 28 | Refills: 0
Start: 2023-01-19 | End: 2023-02-01

## 2023-01-19 RX ADMIN — Medication 50 MILLIGRAM(S): at 20:16

## 2023-01-19 RX ADMIN — Medication 50 MILLIGRAM(S): at 18:39

## 2023-01-19 RX ADMIN — PANTOPRAZOLE SODIUM 40 MILLIGRAM(S): 20 TABLET, DELAYED RELEASE ORAL at 08:07

## 2023-01-19 RX ADMIN — LITHIUM CARBONATE 300 MILLIGRAM(S): 300 TABLET, EXTENDED RELEASE ORAL at 12:09

## 2023-01-19 RX ADMIN — Medication 1 MILLIGRAM(S): at 12:35

## 2023-01-19 RX ADMIN — Medication 50 MILLIGRAM(S): at 08:25

## 2023-01-19 RX ADMIN — RISPERIDONE 1 MILLIGRAM(S): 4 TABLET ORAL at 08:07

## 2023-01-19 RX ADMIN — RISPERIDONE 2 MILLIGRAM(S): 4 TABLET ORAL at 20:16

## 2023-01-19 RX ADMIN — LITHIUM CARBONATE 300 MILLIGRAM(S): 300 TABLET, EXTENDED RELEASE ORAL at 20:16

## 2023-01-19 NOTE — BH INPATIENT PSYCHIATRY PROGRESS NOTE - PRN MEDS
MEDICATIONS  (PRN):  acetaminophen     Tablet .. 650 milliGRAM(s) Oral every 6 hours PRN Mild Pain (1 - 3), Moderate Pain (4 - 6), Severe Pain (7 - 10)  aluminum hydroxide/magnesium hydroxide/simethicone Suspension 30 milliLiter(s) Oral every 4 hours PRN Dyspepsia  haloperidol     Tablet 5 milliGRAM(s) Oral every 6 hours PRN agitation  hydrocortisone hemorrhoidal Suppository 1 Suppository(s) Rectal daily PRN hemorrhoid sx  hydrOXYzine hydrochloride 50 milliGRAM(s) Oral every 6 hours PRN anxiety  LORazepam     Tablet 1 milliGRAM(s) Oral two times a day PRN Anxiety/Insomnia  melatonin. 5 milliGRAM(s) Oral at bedtime PRN Insomnia  sodium chloride 0.65% Nasal 1 Spray(s) Both Nostrils every 4 hours PRN stuffiness  
MEDICATIONS  (PRN):  acetaminophen     Tablet .. 650 milliGRAM(s) Oral every 6 hours PRN Mild Pain (1 - 3), Moderate Pain (4 - 6), Severe Pain (7 - 10)  aluminum hydroxide/magnesium hydroxide/simethicone Suspension 30 milliLiter(s) Oral every 4 hours PRN Dyspepsia  haloperidol     Tablet 5 milliGRAM(s) Oral every 6 hours PRN agitation  hydrOXYzine hydrochloride 50 milliGRAM(s) Oral every 6 hours PRN anxiety  LORazepam     Tablet 2 milliGRAM(s) Oral every 6 hours PRN Anxiety  melatonin. 5 milliGRAM(s) Oral at bedtime PRN Insomnia  
MEDICATIONS  (PRN):  acetaminophen     Tablet .. 650 milliGRAM(s) Oral every 6 hours PRN Mild Pain (1 - 3), Moderate Pain (4 - 6), Severe Pain (7 - 10)  aluminum hydroxide/magnesium hydroxide/simethicone Suspension 30 milliLiter(s) Oral every 4 hours PRN Dyspepsia  haloperidol     Tablet 5 milliGRAM(s) Oral every 6 hours PRN agitation  hydrOXYzine hydrochloride 50 milliGRAM(s) Oral every 6 hours PRN anxiety  LORazepam     Tablet 1 milliGRAM(s) Oral two times a day PRN Anxiety/Insomnia  melatonin. 5 milliGRAM(s) Oral at bedtime PRN Insomnia  sodium chloride 0.65% Nasal 1 Spray(s) Both Nostrils every 4 hours PRN stuffiness  
MEDICATIONS  (PRN):  acetaminophen     Tablet .. 650 milliGRAM(s) Oral every 6 hours PRN Mild Pain (1 - 3), Moderate Pain (4 - 6), Severe Pain (7 - 10)  aluminum hydroxide/magnesium hydroxide/simethicone Suspension 30 milliLiter(s) Oral every 4 hours PRN Dyspepsia  haloperidol     Tablet 5 milliGRAM(s) Oral every 6 hours PRN agitation  hydrocortisone hemorrhoidal Suppository 1 Suppository(s) Rectal daily PRN hemorrhoid sx  hydrOXYzine hydrochloride 50 milliGRAM(s) Oral every 6 hours PRN anxiety  melatonin. 5 milliGRAM(s) Oral at bedtime PRN Insomnia  sodium chloride 0.65% Nasal 1 Spray(s) Both Nostrils every 4 hours PRN stuffiness  
MEDICATIONS  (PRN):  acetaminophen     Tablet .. 650 milliGRAM(s) Oral every 6 hours PRN Mild Pain (1 - 3), Moderate Pain (4 - 6), Severe Pain (7 - 10)  aluminum hydroxide/magnesium hydroxide/simethicone Suspension 30 milliLiter(s) Oral every 4 hours PRN Dyspepsia  haloperidol     Tablet 5 milliGRAM(s) Oral every 6 hours PRN agitation  hydrOXYzine hydrochloride 50 milliGRAM(s) Oral every 6 hours PRN anxiety  LORazepam     Tablet 1 milliGRAM(s) Oral two times a day PRN Anxiety/Insomnia  melatonin. 5 milliGRAM(s) Oral at bedtime PRN Insomnia  sodium chloride 0.65% Nasal 1 Spray(s) Both Nostrils every 4 hours PRN stuffiness  
MEDICATIONS  (PRN):  acetaminophen     Tablet .. 650 milliGRAM(s) Oral every 6 hours PRN Mild Pain (1 - 3), Moderate Pain (4 - 6), Severe Pain (7 - 10)  aluminum hydroxide/magnesium hydroxide/simethicone Suspension 30 milliLiter(s) Oral every 4 hours PRN Dyspepsia  haloperidol     Tablet 5 milliGRAM(s) Oral every 6 hours PRN agitation  hydrOXYzine hydrochloride 50 milliGRAM(s) Oral every 6 hours PRN anxiety  LORazepam     Tablet 2 milliGRAM(s) Oral every 6 hours PRN Anxiety  melatonin. 5 milliGRAM(s) Oral at bedtime PRN Insomnia

## 2023-01-19 NOTE — DIETITIAN INITIAL EVALUATION ADULT - NS FNS DIET ORDER
Diet, Regular:   Lacto-Ovo Veg (Accepts Milk Prod., Eggs)  Free Water Flush Instructions:  double portions     Qty per Day:  WILL EAT FISH     Qty per Day:  Fruit cup every meal (01-18-23 @ 11:52) [Active]

## 2023-01-19 NOTE — DIETITIAN INITIAL EVALUATION ADULT - ENERGY INTAKE
The patient states that she has difficult eating because she is "vegetarian and allergic to peanut butter and jelly." She complains that she would like better food options. initially patient was having difficulty finding meal options due to her diet preferences and being allergic to peanut butter and jelly  last few days patient has been working with  department to optimize PO intake and options of food, this morning ate % of meal tray including [place] toast and pancakes. has been receiving salmon which she enjoys. overall improvement with intake  now [id number]% of estimated energy needs

## 2023-01-19 NOTE — BH INPATIENT PSYCHIATRY PROGRESS NOTE - NSBHFUPINTERVALHXFT_PSY_A_CORE
****THIS IS AN INCOMPLETE NOTE. ****PLAN HAS NOT BEEN FINALIZED BY ATTENDING. ****FULL NOTE TO FOLLOW SHORTLY. ****  Nursing reports no acute events overnight. Per nursing, pt has been concerned about possible UTI,  and after refusing it initially she gave a urine sample.     Chart reviewed, patient seen and evaluated in AM.   Patient remains with pressured speech at times but clearly able to control it and converse.  She is somewhat expansive but expresses no actual delusions.  She can be overinclusive and tangential and gives a fairly nonstop monologue describing her treatment here.  She is extremely talkative and engaging other patients frequently but not awkwardly intrusive.  Denies SI/HI/NSSIB. Deneis AVH/paranoia.     Patient compliant with medications; reports no side effects of medications. Nursing reports no acute events overnight. Per nursing, pt has been concerned about possible UTI,  and after refusing it initially she gave a urine sample.     Chart reviewed, patient seen and evaluated in AM.   Patient remains with pressured speech at times but clearly able to control it and converse.  She is somewhat expansive but expresses no actual delusions.  She can be overinclusive and tangential and gives a fairly nonstop monologue describing her treatment here.  She is extremely talkative and engaging other patients frequently but not awkwardly intrusive.  Denies SI/HI/NSSIB. Deneis AVH/paranoia.     Patient compliant with medications; reports no side effects of medications.    Plan for discharge on Fri 1/20

## 2023-01-19 NOTE — BH INPATIENT PSYCHIATRY PROGRESS NOTE - NSBHATTESTCOMMENTATTENDFT_PSY_A_CORE
patient spoke rapidly but conversation is possible.  no delusions noted and no evidence of hallucinations  at time of interview.
Case discussed with   Medical student and reviewed pertinent concepts.  I concur with the findings and plan  presented here.
Case discussed with resident.  I concur with the findings and plan.
 case discussed with resident and I concur with findings and plan

## 2023-01-19 NOTE — BH INPATIENT PSYCHIATRY PROGRESS NOTE - NSDCCRITERIA_PSY_ALL_CORE
improve mood stability for 4-5 days

## 2023-01-19 NOTE — BH INPATIENT PSYCHIATRY PROGRESS NOTE - NSBHMSETHTPROC_PSY_A_CORE
Overinclusive/Tangential

## 2023-01-19 NOTE — BH INPATIENT PSYCHIATRY PROGRESS NOTE - OTHER
She is able to say that she has a psychiatric disorder, bipolar disorder however has very little since of her own symptomatology and how her condition impacts on her socially. deferred Varies from rapid to mildly pressured but no actual loose associations word salad etc.etc. she is capable of being stopped during conversation and to engage in reciprocal conversation.  impulse control has been episodically impaired but not dangerously so.   Social judgment is questionable but she is able to describe how to safely manage situations of potential danger in an urban environment.   Despite complaining about not having been offered good treatment she tends to refuse much of what is offered.   She is preoccupied with what she identifies as bad treatment by others, primarily staff and also  is somewhat preoccupied with her difficulties with menstruation and hemorrhoids, the particulars of which she freely talks about no matter who she is speaking to.

## 2023-01-19 NOTE — BH INPATIENT PSYCHIATRY DISCHARGE NOTE - HOSPITAL COURSE
Patient was started on lithium 300 mg bid, as well as risperdal 1mg bid, which was titrated up to 2mg bid  with good effect and tolerability. Symptoms gradually improved over the course of hospitalization. The patient was made aware of the risks and benefits of each medication and tolerated them well with no apparent side effects.    Patient has made clinically meaningful progress during his hospitalization and has clearly benefited from medications and psychotherapy. On day of discharge, the patient has improved significantly and no longer requires inpatient treatment and care. Pt will be discharged and follow-up with outpatient care.    Patient will be discharged with the following DSM5 Diagnoses:  - bipolar disorder     Patient will be discharged on the following medications:  - lithium 300 mg PO q12h   - pantoprazole  40 mg PO before breakfast  - risperidone  2 mg two times a day  - trazodone 50 mg Oral qhs    Patient was started on lithium 300 mg bid, as well as risperdal 1mg bid, which was titrated up to 2mg bid  with good effect and tolerability. Symptoms gradually improved over the course of hospitalization. The patient was made aware of the risks and benefits of each medication and tolerated them well with no apparent side effects.    Patient has made clinically meaningful progress during his hospitalization and has clearly benefited from medications and psychotherapy. On day of discharge, the patient has improved significantly and no longer requires inpatient treatment and care. Pt will be discharged and follow-up with outpatient care.    Patient will be discharged with the following DSM5 Diagnoses:  - bipolar I disorder     Patient will be discharged on the following medications:  - lithium 300 mg PO q12h   - pantoprazole  40 mg PO before breakfast  - risperidone  2 mg two times a day  - trazodone 50 mg Oral qhs

## 2023-01-19 NOTE — BH INPATIENT PSYCHIATRY DISCHARGE NOTE - NSDCMRMEDTOKEN_GEN_ALL_CORE_FT
lithium 300 mg oral capsule: 1 cap(s) orally every 12 hours  melatonin 3 mg oral tablet: 1 each orally once a day (at bedtime), As Needed for insomnia  pantoprazole 40 mg oral delayed release tablet: 1 tab(s) orally once a day (before a meal)  traZODone 50 mg oral tablet: 1 tab(s) orally once a day (at bedtime)   lithium 300 mg oral capsule: 1 cap(s) orally every 12 hours  melatonin 3 mg oral tablet: 1 each orally once a day (at bedtime), As Needed for insomnia  pantoprazole 40 mg oral delayed release tablet: 1 tab(s) orally once a day (before a meal)  risperiDONE 2 mg oral tablet: 1 tab(s) orally 2 times a day  traZODone 50 mg oral tablet: 1 tab(s) orally once a day (at bedtime)

## 2023-01-19 NOTE — DIETITIAN INITIAL EVALUATION ADULT - CONTINUE CURRENT NUTRITION CARE PLAN
CONTINUE: pescatarian diet, accepts dairy/eggs/fish. give double portioins as requested and fruit cup with meals 3x/daily     to cont working with patient to optimize PO intake/ options/yes

## 2023-01-19 NOTE — BH INPATIENT PSYCHIATRY PROGRESS NOTE - NSBHMSESPABN_PSY_A_CORE
Increased productivity/Other
Pressured rate/Increased productivity
Increased productivity/Other
Pressured rate/Increased productivity
Increased productivity
Increased productivity

## 2023-01-19 NOTE — BH INPATIENT PSYCHIATRY PROGRESS NOTE - NSBHATTESTTYPEVISIT_PSY_A_CORE
Attending with Resident/Fellow/Student
Attending Only
Attending Only
Attending with Resident/Fellow/Student

## 2023-01-19 NOTE — BH INPATIENT PSYCHIATRY PROGRESS NOTE - CURRENT MEDICATION
MEDICATIONS  (STANDING):  lithium 300 milliGRAM(s) Oral every 12 hours  pantoprazole    Tablet 40 milliGRAM(s) Oral before breakfast  risperiDONE   Tablet 1 milliGRAM(s) Oral two times a day  traZODone 50 milliGRAM(s) Oral at bedtime    MEDICATIONS  (PRN):  acetaminophen     Tablet .. 650 milliGRAM(s) Oral every 6 hours PRN Mild Pain (1 - 3), Moderate Pain (4 - 6), Severe Pain (7 - 10)  aluminum hydroxide/magnesium hydroxide/simethicone Suspension 30 milliLiter(s) Oral every 4 hours PRN Dyspepsia  haloperidol     Tablet 5 milliGRAM(s) Oral every 6 hours PRN agitation  hydrOXYzine hydrochloride 50 milliGRAM(s) Oral every 6 hours PRN anxiety  LORazepam     Tablet 1 milliGRAM(s) Oral two times a day PRN Anxiety/Insomnia  melatonin. 5 milliGRAM(s) Oral at bedtime PRN Insomnia  sodium chloride 0.65% Nasal 1 Spray(s) Both Nostrils every 4 hours PRN stuffiness   MEDICATIONS  (STANDING):  lithium 300 milliGRAM(s) Oral every 12 hours  pantoprazole    Tablet 40 milliGRAM(s) Oral before breakfast  risperiDONE   Tablet 2 milliGRAM(s) Oral two times a day  traZODone 50 milliGRAM(s) Oral at bedtime    MEDICATIONS  (PRN):  acetaminophen     Tablet .. 650 milliGRAM(s) Oral every 6 hours PRN Mild Pain (1 - 3), Moderate Pain (4 - 6), Severe Pain (7 - 10)  aluminum hydroxide/magnesium hydroxide/simethicone Suspension 30 milliLiter(s) Oral every 4 hours PRN Dyspepsia  haloperidol     Tablet 5 milliGRAM(s) Oral every 6 hours PRN agitation  hydrOXYzine hydrochloride 50 milliGRAM(s) Oral every 6 hours PRN anxiety  LORazepam     Tablet 1 milliGRAM(s) Oral two times a day PRN Anxiety/Insomnia  melatonin. 5 milliGRAM(s) Oral at bedtime PRN Insomnia  sodium chloride 0.65% Nasal 1 Spray(s) Both Nostrils every 4 hours PRN stuffiness

## 2023-01-19 NOTE — BH INPATIENT PSYCHIATRY PROGRESS NOTE - NSBHMETABOLIC_PSY_ALL_CORE_FT
BMI: BMI (kg/m2): 11.3 (01-12-23 @ 13:06)  HbA1c: A1C with Estimated Average Glucose Result: 5.0 % (01-14-23 @ 08:25)    Glucose:   BP: 97/53 (01-19-23 @ 05:59) (97/53 - 163/86)  Lipid Panel: Date/Time: 01-14-23 @ 08:25  Cholesterol, Serum: 199  Direct LDL: --  HDL Cholesterol, Serum: 90  Total Cholesterol/HDL Ration Measurement: --  Triglycerides, Serum: 44   BMI: BMI (kg/m2): 11.3 (01-12-23 @ 13:06)  HbA1c: A1C with Estimated Average Glucose Result: 5.0 % (01-14-23 @ 08:25)    Glucose:   BP: 125/84 (01-19-23 @ 16:12) (97/53 - 163/86)  Lipid Panel: Date/Time: 01-14-23 @ 08:25  Cholesterol, Serum: 199  Direct LDL: --  HDL Cholesterol, Serum: 90  Total Cholesterol/HDL Ration Measurement: --  Triglycerides, Serum: 44

## 2023-01-19 NOTE — BH INPATIENT PSYCHIATRY PROGRESS NOTE - NSBHCHARTREVIEWLAB_PSY_A_CORE FT
12.8   5.80  )-----------( 360      ( 14 Jan 2023 08:25 )             39.5       01-14    141  |  104  |  9<L>  ----------------------------<  96  4.4   |  28  |  0.9    Ca    9.7      14 Jan 2023 08:25    TPro  7.9  /  Alb  4.6  /  TBili  0.5  /  DBili  x   /  AST  22  /  ALT  14  /  AlkPhos  47  01-14  

## 2023-01-19 NOTE — DIETITIAN INITIAL EVALUATION ADULT - PERTINENT MEDS FT
MEDICATIONS  (STANDING):  lithium 300 milliGRAM(s) Oral every 12 hours  pantoprazole    Tablet 40 milliGRAM(s) Oral before breakfast  risperiDONE   Tablet 2 milliGRAM(s) Oral two times a day  traZODone 50 milliGRAM(s) Oral at bedtime    MEDICATIONS  (PRN):  acetaminophen     Tablet .. 650 milliGRAM(s) Oral every 6 hours PRN Mild Pain (1 - 3), Moderate Pain (4 - 6), Severe Pain (7 - 10)  aluminum hydroxide/magnesium hydroxide/simethicone Suspension 30 milliLiter(s) Oral every 4 hours PRN Dyspepsia  haloperidol     Tablet 5 milliGRAM(s) Oral every 6 hours PRN agitation  hydrOXYzine hydrochloride 50 milliGRAM(s) Oral every 6 hours PRN anxiety  LORazepam     Tablet 1 milliGRAM(s) Oral two times a day PRN Anxiety/Insomnia  melatonin. 5 milliGRAM(s) Oral at bedtime PRN Insomnia  sodium chloride 0.65% Nasal 1 Spray(s) Both Nostrils every 4 hours PRN stuffiness

## 2023-01-19 NOTE — BH INPATIENT PSYCHIATRY DISCHARGE NOTE - NSDCPROCEDURESFT_PSY_ALL_CORE
Lithium Level, Serum (01.19.23 @ 11:50)    Lithium Level, Serum: 0.60 mmol/L    Culture - Urine (01.17.23 @ 14:45)    Specimen Source: Clean Catch Clean Catch (Midstream)    Culture Results:   <10,000 CFU/mL Normal Urogenital Annika

## 2023-01-19 NOTE — BH DISCHARGE NOTE NURSING/SOCIAL WORK/PSYCH REHAB - PATIENT PORTAL LINK FT
You can access the FollowMyHealth Patient Portal offered by [facility] by registering at the following website: [email or web address]. By joining [facility]’s FollowMyHealth portal, you will also be able to view your health information using other applications (apps) compatible with our system.

## 2023-01-19 NOTE — BH INPATIENT PSYCHIATRY PROGRESS NOTE - NSTXPSYCHOGOAL_PSY_ALL_CORE
Will identify 2 coping skills that assist with focus on reality
Will identify 2 coping skills that help mitigate hallucinations
Will identify 2 coping skills that help mitigate hallucinations
Will identify 2 coping skills that assist with focus on reality

## 2023-01-19 NOTE — DIETITIAN INITIAL EVALUATION ADULT - ORAL INTAKE PTA/DIET HISTORY
patient reports adequate appetite/PO intake at baseline  eats pescatarian diet, accepts eggs/fish/dairy. no trouble chewing/swallowing  meets % estimated energy needs prior to admission

## 2023-01-19 NOTE — BH INPATIENT PSYCHIATRY PROGRESS NOTE - NSCGISEVERILLNESS_PSY_ALL_CORE
4 = Moderately ill – overt symptoms causing noticeable, but modest, functional impairment or distress; symptom level may warrant medication

## 2023-01-19 NOTE — BH INPATIENT PSYCHIATRY DISCHARGE NOTE - NSBHMETABOLIC_PSY_ALL_CORE_FT
BMI: BMI (kg/m2): 11.3 (01-12-23 @ 13:06)  HbA1c: A1C with Estimated Average Glucose Result: 5.0 % (01-14-23 @ 08:25)    Glucose:   BP: 97/53 (01-19-23 @ 05:59) (97/53 - 163/86)  Lipid Panel: Date/Time: 01-14-23 @ 08:25  Cholesterol, Serum: 199  Direct LDL: --  HDL Cholesterol, Serum: 90  Total Cholesterol/HDL Ration Measurement: --  Triglycerides, Serum: 44

## 2023-01-19 NOTE — BH DISCHARGE NOTE NURSING/SOCIAL WORK/PSYCH REHAB - NSCDUDCCRISIS_PSY_A_CORE
[facility] Well  [phone] [facility]-WELL ([phone])  Text "WELL" to 00320  Website: [email or web address] 1 [phone] 788-HOPE (7316) Website: www.[facility].org/.  [facility] Crisis Care for Children, Adults and Families  [address] [place] [address]  Mobile Crisis Hotline – (906) 565-1466/.  [facility]  (162) 845-2059/.  [facility] Behavioral Health Helpline / [facility] Mobile Crisis  (878) 206-AKLA (3089)/.  [facility] Response Crisis Hotline  (247) 460-9173  24 hour telephone crisis intervention and suicide prevention hotline concerned with all mental health issues/718 Suicide and Crisis Lifeline
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
## 2023-01-19 NOTE — DIETITIAN INITIAL EVALUATION ADULT - PERSON TAUGHT/METHOD
patient discussed concerns for gaining weight, discussed patient is at healthy weight/verbal instruction/patient instructed

## 2023-01-19 NOTE — BH INPATIENT PSYCHIATRY PROGRESS NOTE - NSBHCHARTREVIEWVS_PSY_A_CORE FT
Vital Signs Last 24 Hrs  T(C): 35.7 (01-19-23 @ 05:59), Max: 35.7 (01-19-23 @ 05:59)  T(F): 96.3 (01-19-23 @ 05:59), Max: 96.3 (01-19-23 @ 05:59)  HR: 84 (01-19-23 @ 05:59) (84 - 95)  BP: 97/53 (01-19-23 @ 05:59) (97/53 - 134/71)  BP(mean): --  RR: 20 (01-19-23 @ 05:59) (16 - 20)  SpO2: --     Vital Signs Last 24 Hrs  T(C): 35.7 (01-19-23 @ 05:59), Max: 35.7 (01-19-23 @ 05:59)  T(F): 96.3 (01-19-23 @ 05:59), Max: 96.3 (01-19-23 @ 05:59)  HR: 84 (01-19-23 @ 05:59) (84 - 95)  BP: 97/53 (01-19-23 @ 05:59) (97/53 - 128/75)  BP(mean): --  RR: 20 (01-19-23 @ 05:59) (16 - 20)  SpO2: --     Vital Signs Last 24 Hrs  T(C): 35.7 (01-19-23 @ 05:59), Max: 35.7 (01-19-23 @ 05:59)  T(F): 96.3 (01-19-23 @ 05:59), Max: 96.3 (01-19-23 @ 05:59)  HR: 84 (01-19-23 @ 05:59) (84 - 84)  BP: 97/53 (01-19-23 @ 05:59) (97/53 - 97/53)  BP(mean): --  RR: 20 (01-19-23 @ 05:59) (20 - 20)  SpO2: --     Vital Signs Last 24 Hrs  T(C): 35.4 (01-19-23 @ 16:12), Max: 35.7 (01-19-23 @ 05:59)  T(F): 95.8 (01-19-23 @ 16:12), Max: 96.3 (01-19-23 @ 05:59)  HR: 96 (01-19-23 @ 16:12) (84 - 96)  BP: 125/84 (01-19-23 @ 16:12) (97/53 - 125/84)  BP(mean): --  RR: 16 (01-19-23 @ 16:12) (16 - 20)  SpO2: --

## 2023-01-19 NOTE — BH INPATIENT PSYCHIATRY PROGRESS NOTE - NSBHASSESSSUMMFT_PSY_ALL_CORE
Ms Swartz is a 27 year old woman with a history of Bipolar disorder who was admitted to the inpatient psychiatric floor for the management of susanna.   Patient continues to be hyperverbal, labile in mood , intrusive , with some anxiety  and poor insight into her illness .   At this time, patient is considered a danger to himself and others and needs inpatient psychiatric hospitalization for medication management and symptoms stabilization.      On interview, the patient is a poor historian, unable to give a clear timeline of events and unable to clearly explain how she arrived at this ED today. Her speech is muffled, and tangential. Per triage note, patient presented as a walk-in. She seems to indicate now that she had waved down the police, says she stated she wanted to be somewhere warm, and they dropped her off at the hospital. The patient states she initially presented to the crisis center at the behest of her mother for a "mental health evaluation" but cannot specify what the concerns were. She describes being very stressed and overwhelmed juggling two jobs recently but otherwise denies psychiatric concerns. She says her mood has been "really happy." She denies any SI, denies AVH or paranoia.    On assessment today, patient endorses hx consistent with bipolar disorder (euphoria, grandiosity, shows flight of ideas, reports increased goal-directed activity: walked 18miles), reports decreased sleep need, and shows talkativeness/pressured speech).  Patient also endorses significant marijuana use and anxiety.     1-17–-23  patient is pressing hard to leave and is in some ways uncooperative with tests and interventions necessary for her release.  She is very preoccupied with telling people what bad treatment she is receiving.    Impression:  - bipolar disorder, susanna   - cannabis-induced mood disorder (anxiety)   - possible anxiety disorder   - possible PTSD per hx     Plan:   #?UTI, pt endorses she was on doxycycline for "urea plasma" but that med was not tolerated due to GI side effects. Denies dysuria.   - f/u UA, UCx     #bipolar disorder  - lithium 300 mg q12h (started 1/13)  - lithium levels ordered for Tues 1/17 AM; pending collection   - risperidone 1mg bid (started 1/12)     #anxiety   - hydroxyzine hydrochloride 50 mg q6h PRN anxiety  - lorazepam 2mg q6h PRN Anxiety    #PRNs  - acetaminophen 650mg q6h PRN for pain   - haloperidol 5mg q6h PRN for agitation   - melatonin 5mg PO bedtime PRN Insomnia  i Ms Swartz is a 27 year old woman with a history of Bipolar disorder who was admitted to the inpatient psychiatric floor for the management of susanna.   Patient continues to be hyperverbal, labile in mood , intrusive , with some anxiety  and poor insight into her illness .   At this time, patient is considered a danger to himself and others and needs inpatient psychiatric hospitalization for medication management and symptoms stabilization.      On interview, the patient is a poor historian, unable to give a clear timeline of events and unable to clearly explain how she arrived at this ED today. Her speech is muffled, and tangential. Per triage note, patient presented as a walk-in. She seems to indicate now that she had waved down the police, says she stated she wanted to be somewhere warm, and they dropped her off at the hospital. The patient states she initially presented to the crisis center at the behest of her mother for a "mental health evaluation" but cannot specify what the concerns were. She describes being very stressed and overwhelmed juggling two jobs recently but otherwise denies psychiatric concerns. She says her mood has been "really happy." She denies any SI, denies AVH or paranoia.    On assessment today, endorses hx consistent with bipolar disorder (euphoria, grandiosity, shows flight of ideas, reports increased goal-directed activity: walked 18miles), reports decreased sleep need, and shows talkativeness/pressured speech).  Patient also endorses significant marijuana use and anxiety.     1-17–-23  patient is pressing hard to leave and is in some ways uncooperative with tests and interventions necessary for her release.  She is very preoccupied with telling people what bad treatment she is receiving.    Impression:  - bipolar disorder, susanna   - cannabis-induced mood disorder (anxiety)   - possible anxiety disorder   - possible PTSD per hx     Plan:   #?UTI, pt endorses she was on doxycycline for "urea plasma" but that med was not tolerated due to GI side effects. Denies dysuria.   - f/u UA, UCx     #bipolar disorder  - lithium 300 mg q12h (started 1/13)  - lithium levels ordered for Tues 1/17 AM; pending collection   - risperidone 2mg bid (uptitrated 1/19)     #anxiety   - hydroxyzine hydrochloride 50 mg q6h PRN anxiety  - lorazepam 2mg q6h PRN Anxiety    #PRNs  - acetaminophen 650mg q6h PRN for pain   - haloperidol 5mg q6h PRN for agitation   - melatonin 5mg PO bedtime PRN Insomnia  i Ms Swartz is a 27 year old woman with a history of Bipolar disorder who was admitted to the inpatient psychiatric floor for the management of susanna.   Patient continues to be hyperverbal, labile in mood , intrusive , with some anxiety  and poor insight into her illness .   At this time, patient is considered a danger to himself and others and needs inpatient psychiatric hospitalization for medication management and symptoms stabilization.      On interview, the patient is a poor historian, unable to give a clear timeline of events and unable to clearly explain how she arrived at this ED today. Her speech is muffled, and tangential. Per triage note, patient presented as a walk-in. She seems to indicate now that she had waved down the police, says she stated she wanted to be somewhere warm, and they dropped her off at the hospital. The patient states she initially presented to the crisis center at the behest of her mother for a "mental health evaluation" but cannot specify what the concerns were. She describes being very stressed and overwhelmed juggling two jobs recently but otherwise denies psychiatric concerns. She says her mood has been "really happy." She denies any SI, denies AVH or paranoia.    On assessment today, endorses hx consistent with bipolar disorder (euphoria, grandiosity, shows flight of ideas, reports increased goal-directed activity: walked 18miles), reports decreased sleep need, and shows talkativeness/pressured speech).  Patient also endorses significant marijuana use and anxiety.     1-17–-23  patient is pressing hard to leave and is in some ways uncooperative with tests and interventions necessary for her release.  She is very preoccupied with telling people what bad treatment she is receiving.    Impression:  - bipolar disorder, susanna   - cannabis-induced mood disorder (anxiety)   - possible anxiety disorder   - possible PTSD per hx     Plan:   #?UTI, pt endorses she was on doxycycline for "urea plasma" but that med was not tolerated due to GI side effects. Denies dysuria.   - UCx negative  - discussed that pt should follow up with PCP if symptoms persist    #bipolar disorder  - lithium 300 mg q12h (started 1/13)  - lithium levels ordered for Tues 1/17 AM; pending collection   - risperidone 2mg bid (uptitrated 1/19)     #anxiety   - hydroxyzine hydrochloride 50 mg q6h PRN anxiety  - lorazepam 2mg q6h PRN Anxiety    #PRNs  - acetaminophen 650mg q6h PRN for pain   - haloperidol 5mg q6h PRN for agitation   - melatonin 5mg PO bedtime PRN Insomnia  i

## 2023-01-20 VITALS — DIASTOLIC BLOOD PRESSURE: 71 MMHG | HEART RATE: 97 BPM | RESPIRATION RATE: 18 BRPM | SYSTOLIC BLOOD PRESSURE: 131 MMHG

## 2023-01-20 RX ADMIN — Medication 650 MILLIGRAM(S): at 03:55

## 2023-01-20 RX ADMIN — LITHIUM CARBONATE 300 MILLIGRAM(S): 300 TABLET, EXTENDED RELEASE ORAL at 08:11

## 2023-01-20 RX ADMIN — RISPERIDONE 2 MILLIGRAM(S): 4 TABLET ORAL at 08:11

## 2023-01-20 RX ADMIN — PANTOPRAZOLE SODIUM 40 MILLIGRAM(S): 20 TABLET, DELAYED RELEASE ORAL at 08:11

## 2023-01-20 RX ADMIN — Medication 1 MILLIGRAM(S): at 05:20

## 2023-01-20 RX ADMIN — Medication 50 MILLIGRAM(S): at 03:54

## 2023-01-20 RX ADMIN — Medication 650 MILLIGRAM(S): at 04:15

## 2023-01-24 DIAGNOSIS — F31.9 BIPOLAR DISORDER, UNSPECIFIED: ICD-10-CM

## 2023-01-24 DIAGNOSIS — F12.90 CANNABIS USE, UNSPECIFIED, UNCOMPLICATED: ICD-10-CM

## 2023-01-24 DIAGNOSIS — F43.10 POST-TRAUMATIC STRESS DISORDER, UNSPECIFIED: ICD-10-CM

## 2023-01-24 DIAGNOSIS — Z91.018 ALLERGY TO OTHER FOODS: ICD-10-CM

## 2023-01-24 DIAGNOSIS — F41.9 ANXIETY DISORDER, UNSPECIFIED: ICD-10-CM

## 2023-01-24 DIAGNOSIS — Z91.199 PATIENT'S NONCOMPLIANCE WITH OTHER MEDICAL TREATMENT AND REGIMEN DUE TO UNSPECIFIED REASON: ICD-10-CM

## 2023-01-24 DIAGNOSIS — F31.10 BIPOLAR DISORDER, CURRENT EPISODE MANIC WITHOUT PSYCHOTIC FEATURES, UNSPECIFIED: ICD-10-CM

## 2023-01-25 NOTE — BH SOCIAL WORK CONFIRMATION FOLLOW UP NOTE - NSLINKEDTOLOC_PSY_ALL_CORE
Serena made her outpatient appointment with Juliane Bueno [phone], [phone] on 1/23 as scheduled.

## 2023-01-26 DIAGNOSIS — F31.13 BIPOLAR DISORDER, CURRENT EPISODE MANIC WITHOUT PSYCHOTIC FEATURES, SEVERE: ICD-10-CM

## 2023-02-04 ENCOUNTER — INPATIENT (INPATIENT)
Facility: HOSPITAL | Age: 28
LOS: 10 days | Discharge: HOME | End: 2023-02-15
Attending: PSYCHIATRY & NEUROLOGY | Admitting: PSYCHIATRY & NEUROLOGY
Payer: MEDICAID

## 2023-02-04 VITALS
SYSTOLIC BLOOD PRESSURE: 115 MMHG | HEART RATE: 79 BPM | DIASTOLIC BLOOD PRESSURE: 77 MMHG | OXYGEN SATURATION: 100 % | TEMPERATURE: 98 F | RESPIRATION RATE: 16 BRPM | HEIGHT: 72 IN

## 2023-02-04 LAB
APAP SERPL-MCNC: <10 UG/ML — LOW (ref 15–25)
BASOPHILS # BLD AUTO: 0.08 K/UL — SIGNIFICANT CHANGE UP (ref 0–0.2)
BASOPHILS NFR BLD AUTO: 1.2 % — SIGNIFICANT CHANGE UP (ref 0–2)
EOSINOPHIL # BLD AUTO: 0.21 K/UL — SIGNIFICANT CHANGE UP (ref 0–0.5)
EOSINOPHIL NFR BLD AUTO: 3.1 % — SIGNIFICANT CHANGE UP (ref 0–6)
ETHANOL SERPL-MCNC: 21 MG/DL — HIGH
HCT VFR BLD CALC: 37.8 % — SIGNIFICANT CHANGE UP (ref 34.5–45)
HGB BLD-MCNC: 12.3 G/DL — SIGNIFICANT CHANGE UP (ref 11.5–15.5)
IANC: 4.08 K/UL — SIGNIFICANT CHANGE UP (ref 1.8–7.4)
IMM GRANULOCYTES NFR BLD AUTO: 0.4 % — SIGNIFICANT CHANGE UP (ref 0–0.9)
LITHIUM SERPL-MCNC: <0.1 MMOL/L — LOW (ref 0.6–1.2)
LYMPHOCYTES # BLD AUTO: 1.84 K/UL — SIGNIFICANT CHANGE UP (ref 1–3.3)
LYMPHOCYTES # BLD AUTO: 27.3 % — SIGNIFICANT CHANGE UP (ref 13–44)
MCHC RBC-ENTMCNC: 26.7 PG — LOW (ref 27–34)
MCHC RBC-ENTMCNC: 32.5 GM/DL — SIGNIFICANT CHANGE UP (ref 32–36)
MCV RBC AUTO: 82 FL — SIGNIFICANT CHANGE UP (ref 80–100)
MONOCYTES # BLD AUTO: 0.51 K/UL — SIGNIFICANT CHANGE UP (ref 0–0.9)
MONOCYTES NFR BLD AUTO: 7.6 % — SIGNIFICANT CHANGE UP (ref 2–14)
NEUTROPHILS # BLD AUTO: 4.08 K/UL — SIGNIFICANT CHANGE UP (ref 1.8–7.4)
NEUTROPHILS NFR BLD AUTO: 60.4 % — SIGNIFICANT CHANGE UP (ref 43–77)
NRBC # BLD: 0 /100 WBCS — SIGNIFICANT CHANGE UP (ref 0–0)
NRBC # FLD: 0 K/UL — SIGNIFICANT CHANGE UP (ref 0–0)
PLATELET # BLD AUTO: 386 K/UL — SIGNIFICANT CHANGE UP (ref 150–400)
RBC # BLD: 4.61 M/UL — SIGNIFICANT CHANGE UP (ref 3.8–5.2)
RBC # FLD: 13.8 % — SIGNIFICANT CHANGE UP (ref 10.3–14.5)
SALICYLATES SERPL-MCNC: <0.3 MG/DL — LOW (ref 15–30)
TOXICOLOGY SCREEN, DRUGS OF ABUSE, SERUM RESULT: SIGNIFICANT CHANGE UP
WBC # BLD: 6.75 K/UL — SIGNIFICANT CHANGE UP (ref 3.8–10.5)
WBC # FLD AUTO: 6.75 K/UL — SIGNIFICANT CHANGE UP (ref 3.8–10.5)

## 2023-02-04 PROCEDURE — 99285 EMERGENCY DEPT VISIT HI MDM: CPT

## 2023-02-04 NOTE — ED BEHAVIORAL HEALTH ASSESSMENT NOTE - PSYCHIATRIC ISSUES AND PLAN (INCLUDE STANDING AND PRN MEDICATION)
Haldol 5mg and ativan 2mg IM prn for agitation. Haldol 5mg and ativan 2mg IM prn for agitation. start risperidone 1mg bid Haldol 5mg and ativan 2mg IM prn for agitation. start risperidone 1mg bid and lithium 300mg qhs

## 2023-02-04 NOTE — ED BEHAVIORAL HEALTH ASSESSMENT NOTE - SUMMARY
The patient is a 28 yo woman, domiciled w/ various family members, has 5 yo son, with no sig PMH, and psych hx of bipolar disorder, cannabis use disorder, PTSD, 3 prior hospitalizations (most recently 2y ago in [place]), non-adherent to outpatient care, who self-presents with disorganized behavior after first eloping during evaluation at [facility] yesterday 1/10, where there was concern that she was acutely manic.    While patient is not presenting with any overt manic/psychotic symptoms currently, history from family is highly concerning for such symptoms including flight of ideas, grandiosity, mood lability/instability, irritability and aggression. Furthermore, the patient is a very poor historian with tangential speech, poor relatedness. The context of her presentation of walking out from the crisis center, wandering, then presenting to this ED agitated w/o being able to clearly describe preceding events is concerning for disorganized behavior and significantly impaired functioning. She is also not engaged in outpatient treatment. Therefore she meets criteria for involuntary admission due to inability to care properly for herself. While cannabis use may be a significant contributing factor, there is no e/o at this time that here symptoms are improving under observation or are explained entirely by intoxication.    Plan:  - admit to psych involuntary, 9.27 legals  - pt has been accepted for transfer to [facility] but per inpatient unit request, will be transferred in the AM  - can start Risperdal 1mg BID  - for acute agitation, can give Haldol 5mg + Ativan 2mg + Benadryl 50mg PO/IM q6h PRN The patient is a 28 yo woman, domiciled w/ various family members, has 3 yo son, with no sig PMH, and psych hx of bipolar disorder, cannabis use disorder, PTSD, 3 prior hospitalizations (most recently 2y ago in [place]), non-adherent to outpatient care, who self-presents with disorganized behavior after first eloping during evaluation at [facility] yesterday 1/10, where there was concern that she was acutely manic.    Pt. presents with symptoms including flight of ideas, grandiosity, mood lability/instability, irritability, paranoia, disorganized behaviors  and aggression. Reports mediation non adherence after last admission. Per psychiatrist , pt. has poor insight and is a danger to self. She is not able to care for herself due to worsening of symptoms. Contributing factor is probably daily MJ use. She is  not engaged in outpatient treatment. Therefore she meets criteria for involuntary admission due to inability to care properly for herself. She will benefit from inpatient admission for further stabilization.    Plan:  - admit to L3 psych involuntary, 9.39 legals  - start Risperdal 1mg BID  - defer other medications  to inpatient team  - for acute agitation, can give Haldol 5mg + Ativan 2mg  PO/IM q8h PRN The patient is a 28 yo woman, domiciled w/ various family members, has 3 yo son, with no sig PMH, and psych hx of bipolar disorder, cannabis use disorder, PTSD, 3 prior hospitalizations (most recently 2y ago in [place]), non-adherent to outpatient care, who self-presents with disorganized behavior after first eloping during evaluation at [facility] yesterday 1/10, where there was concern that she was acutely manic.    Pt. presents with symptoms including flight of ideas, grandiosity, mood lability/instability, irritability, paranoia, disorganized behaviors  and aggression. Reports mediation non adherence after last admission. Per psychiatrist , pt. has poor insight and is a danger to self. She is not able to care for herself due to worsening of symptoms. Contributing factor is probably daily MJ use. She is  not engaged in outpatient treatment. Therefore she meets criteria for involuntary admission due to inability to care properly for herself. She will benefit from inpatient admission for further stabilization.    Plan:  - admit to L3 psych involuntary, 9.39 legals  - start Risperdal 1mg BID and Lithium 300mg qhs  - defer other medications  to inpatient team  - for acute agitation, can give Haldol 5mg + Ativan 2mg  PO/IM q8h PRN

## 2023-02-04 NOTE — ED PROVIDER NOTE - CPE EDP SKIN NORM
Maternal Fetal Medicine Ultrasound    Ms. Mayo was seen in the [facility] today.     The primary encounter diagnosis was Screening, , for fetal anatomic survey. A diagnosis of Low-lying placenta was also pertinent to this visit.      Vitals:    20 1548   BP: 112/72       Please select the imaging tab and linked document to review today's report.    I encouraged the patient to call with any questions or concerns about today's visit.      Thank you Dr. Lezama for allowing us to participate in the care of your patient.  As always, if we can be of any further assistance, please do not hesitate to contact us.   
normal...
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
## 2023-02-04 NOTE — ED BEHAVIORAL HEALTH ASSESSMENT NOTE - DESCRIPTION
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) agitated, disorganized and paranoid   ICU Vital Signs Last 24 Hrs  T(C): 36.9 (04 Feb 2023 21:27), Max: 36.9 (04 Feb 2023 21:27)  T(F): 98.4 (04 Feb 2023 21:27), Max: 98.4 (04 Feb 2023 21:27)  HR: 79 (04 Feb 2023 21:27) (79 - 79)  BP: 115/77 (04 Feb 2023 21:27) (115/77 - 115/77)  BP(mean): --  ABP: --  ABP(mean): --  RR: 16 (04 Feb 2023 21:27) (16 - 16)  SpO2: 100% (04 Feb 2023 21:27) (100% - 100%)    O2 Parameters below as of 04 Feb 2023 21:27  Patient On (Oxygen Delivery Method): room air reports working as a  for the [employer] (for past 2mo) - 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

## 2023-02-04 NOTE — ED BEHAVIORAL HEALTH ASSESSMENT NOTE - NSSUICPROTFACT_PSY_ALL_CORE
Responsibility to children, family, or others/Supportive social network of family or friends/Cultural, spiritual and/or moral attitudes against suicide/Catholic beliefs

## 2023-02-04 NOTE — ED BEHAVIORAL HEALTH ASSESSMENT NOTE - RISK ASSESSMENT
risk factors: non-compliance w/ treatment, social stressors, unstable housing, hx of hospitalizations    protective: has child, is knowledgeable about MH resources risk factors: non-compliance w/ treatment, social stressors, unstable housing, hx of hospitalizations, poor insight  protective: has child, is knowledgeable about MH resources, supportive family

## 2023-02-04 NOTE — ED ADULT NURSE NOTE - NSFALLRSKASSESSDT_ED_ALL_ED
Phone call to pharmacy spoke with Benny Espino and she states potassium is compatible with D5 % in L.R. 04-Feb-2023 22:27

## 2023-02-04 NOTE — ED BEHAVIORAL HEALTH ASSESSMENT NOTE - CURRENT MEDICATION
not taking medications.  Per pt's psychiatrist pt. is on [place] not taking medications.  Per pt's psychiatrist pt. is on [place]. Per records, pt. was discharged on risperidone, lithium and trazodone.

## 2023-02-04 NOTE — ED PROVIDER NOTE - CLINICAL SUMMARY MEDICAL DECISION MAKING FREE TEXT BOX
26 y/o  F  hx Bipolar D/O ,Psychosis   Labs, Urine Tox/UA, EKG  Medical evaluation performed. There is no clinical evidence of intoxication or any acute medical problem requiring immediate intervention. Patient is awaiting psychiatric consultation. Final disposition will be determined by psychiatrist.

## 2023-02-04 NOTE — ED ADULT NURSE NOTE - OBJECTIVE STATEMENT
Pt presents to , alert&orientedx2-3, ambulatory, pmhx bipolar, brought in by cousins for bizarre behavior. As per family member, pt has been non-compliant with medications, states that she has been wandering around since 4am. Upon arrival, pt is very paranoid, continues to ask nursing staff where they are from, asking why we are providing nursing care. Pt also states "she sent me here, she sent me here to get hospitalized" then refuses to elaborate. Pt also hyperverbal and disorganized. Denies any SI/HI/AH/VH. Pending psych eval.
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
## 2023-02-04 NOTE — ED ADULT NURSE NOTE - CHIEF COMPLAINT QUOTE
pt accompanied by cousins presents with abnormal behavior. pt responds "I don't know" to all questions asked.  pt hyperverbal in triage, perseverating on actions staff members from a facility and family members who are misleading her. per cousin pt has not been taking her meds which they believe are for bipolar. states pt has been wandering, up since 4am, was driving and ran out of gas, may have eloped from psychiatric facility.

## 2023-02-04 NOTE — ED PROVIDER NOTE - PROGRESS NOTE DETAILS
Patient received in signout from ANGELIQUE Shafer  The patient is a 27y Female who has a past medical and surgery history of Stomach ulcer bipolar disorder, cannabis use disorder, PTSD, 3 prior hospitalizations (most recently 2y ago in [place]), PTED with disorganized behavior (flight of ideas, grandiosity, mood lability/instability, irritability, paranoia, disorganized behaviors  and aggression Of note patient eloped yesterday from [facility] yesterday 1/10, where there was concern that she was acutely manic.     Vital Signs Last 24 Hrs  T(F): 97.5 HR: 70 BP: 103/76 RR: 16 SpO2: 100% (05 Feb 2023 02:00)  PE: as described; my additions and exceptions are noted in the chart    DATA:  EKG: NSR@70  LAB: Pending at time of evaluation                        12.3   6.75  )-----------( 386      ( 04 Feb 2023 22:51 )             37.8   02-04    138  |  103  |  7   ----------------------------<  88  3.9   |  20<L>  |  0.64    Ca    9.5      04 Feb 2023 22:51    TPro  7.9  /  Alb  4.8  /  TBili  0.7  /  DBili  x   /  AST  18  /  ALT  10  /  AlkPhos  47  02-04        IMPRESSION/RISK:  Dx= Soledad  Differential includes but not limited to conditions listed in order of most possible first:     Plan:  Pt involuntary admission due to inability to care properly for herself and active Cannabis use   - Admit to L3 psych involuntary, 9.39 legals  - Risperdal 1mg BID and Lithium 300mg qhs  - defer other medications  to inpatient team  - Haldol 5mg + Ativan 2mg  PO/IM q8h PRN for agitation

## 2023-02-05 DIAGNOSIS — F12.90 CANNABIS USE, UNSPECIFIED, UNCOMPLICATED: ICD-10-CM

## 2023-02-05 DIAGNOSIS — F31.2 BIPOLAR DISORDER, CURRENT EPISODE MANIC SEVERE WITH PSYCHOTIC FEATURES: ICD-10-CM

## 2023-02-05 DIAGNOSIS — F43.10 POST-TRAUMATIC STRESS DISORDER, UNSPECIFIED: ICD-10-CM

## 2023-02-05 DIAGNOSIS — Z91.14 PATIENT'S OTHER NONCOMPLIANCE WITH MEDICATION REGIMEN: ICD-10-CM

## 2023-02-05 DIAGNOSIS — F12.10 CANNABIS ABUSE, UNCOMPLICATED: ICD-10-CM

## 2023-02-05 DIAGNOSIS — F29 UNSPECIFIED PSYCHOSIS NOT DUE TO A SUBSTANCE OR KNOWN PHYSIOLOGICAL CONDITION: ICD-10-CM

## 2023-02-05 LAB
ALBUMIN SERPL ELPH-MCNC: 4.8 G/DL — SIGNIFICANT CHANGE UP (ref 3.3–5)
ALP SERPL-CCNC: 47 U/L — SIGNIFICANT CHANGE UP (ref 40–120)
ALT FLD-CCNC: 10 U/L — SIGNIFICANT CHANGE UP (ref 4–33)
ANION GAP SERPL CALC-SCNC: 15 MMOL/L — HIGH (ref 7–14)
AST SERPL-CCNC: 18 U/L — SIGNIFICANT CHANGE UP (ref 4–32)
B PERT DNA SPEC QL NAA+PROBE: SIGNIFICANT CHANGE UP
B PERT+PARAPERT DNA PNL SPEC NAA+PROBE: SIGNIFICANT CHANGE UP
BILIRUB SERPL-MCNC: 0.7 MG/DL — SIGNIFICANT CHANGE UP (ref 0.2–1.2)
BORDETELLA PARAPERTUSSIS (RAPRVP): SIGNIFICANT CHANGE UP
BUN SERPL-MCNC: 7 MG/DL — SIGNIFICANT CHANGE UP (ref 7–23)
C PNEUM DNA SPEC QL NAA+PROBE: SIGNIFICANT CHANGE UP
CALCIUM SERPL-MCNC: 9.5 MG/DL — SIGNIFICANT CHANGE UP (ref 8.4–10.5)
CHLORIDE SERPL-SCNC: 103 MMOL/L — SIGNIFICANT CHANGE UP (ref 98–107)
CO2 SERPL-SCNC: 20 MMOL/L — LOW (ref 22–31)
COVID-19 SPIKE DOMAIN AB INTERP: POSITIVE
COVID-19 SPIKE DOMAIN ANTIBODY RESULT: >250 U/ML — HIGH
CREAT SERPL-MCNC: 0.64 MG/DL — SIGNIFICANT CHANGE UP (ref 0.5–1.3)
EGFR: 124 ML/MIN/1.73M2 — SIGNIFICANT CHANGE UP
FLUAV SUBTYP SPEC NAA+PROBE: SIGNIFICANT CHANGE UP
FLUBV RNA SPEC QL NAA+PROBE: SIGNIFICANT CHANGE UP
GLUCOSE SERPL-MCNC: 88 MG/DL — SIGNIFICANT CHANGE UP (ref 70–99)
HADV DNA SPEC QL NAA+PROBE: SIGNIFICANT CHANGE UP
HCG SERPL-ACNC: <5 MIU/ML — SIGNIFICANT CHANGE UP
HCOV 229E RNA SPEC QL NAA+PROBE: SIGNIFICANT CHANGE UP
HCOV HKU1 RNA SPEC QL NAA+PROBE: SIGNIFICANT CHANGE UP
HCOV NL63 RNA SPEC QL NAA+PROBE: SIGNIFICANT CHANGE UP
HCOV OC43 RNA SPEC QL NAA+PROBE: SIGNIFICANT CHANGE UP
HMPV RNA SPEC QL NAA+PROBE: SIGNIFICANT CHANGE UP
HPIV1 RNA SPEC QL NAA+PROBE: SIGNIFICANT CHANGE UP
HPIV2 RNA SPEC QL NAA+PROBE: SIGNIFICANT CHANGE UP
HPIV3 RNA SPEC QL NAA+PROBE: SIGNIFICANT CHANGE UP
HPIV4 RNA SPEC QL NAA+PROBE: SIGNIFICANT CHANGE UP
M PNEUMO DNA SPEC QL NAA+PROBE: SIGNIFICANT CHANGE UP
POTASSIUM SERPL-MCNC: 3.9 MMOL/L — SIGNIFICANT CHANGE UP (ref 3.5–5.3)
POTASSIUM SERPL-SCNC: 3.9 MMOL/L — SIGNIFICANT CHANGE UP (ref 3.5–5.3)
PROT SERPL-MCNC: 7.9 G/DL — SIGNIFICANT CHANGE UP (ref 6–8.3)
RAPID RVP RESULT: SIGNIFICANT CHANGE UP
RSV RNA SPEC QL NAA+PROBE: SIGNIFICANT CHANGE UP
RV+EV RNA SPEC QL NAA+PROBE: SIGNIFICANT CHANGE UP
SARS-COV-2 IGG+IGM SERPL QL IA: >250 U/ML — HIGH
SARS-COV-2 IGG+IGM SERPL QL IA: POSITIVE
SARS-COV-2 RNA SPEC QL NAA+PROBE: SIGNIFICANT CHANGE UP
SODIUM SERPL-SCNC: 138 MMOL/L — SIGNIFICANT CHANGE UP (ref 135–145)
TSH SERPL-MCNC: 2.29 UIU/ML — SIGNIFICANT CHANGE UP (ref 0.27–4.2)

## 2023-02-05 PROCEDURE — 99222 1ST HOSP IP/OBS MODERATE 55: CPT

## 2023-02-05 RX ORDER — HALOPERIDOL DECANOATE 100 MG/ML
5 INJECTION INTRAMUSCULAR EVERY 6 HOURS
Refills: 0 | Status: DISCONTINUED | OUTPATIENT
Start: 2023-02-05 | End: 2023-02-09

## 2023-02-05 RX ORDER — DIPHENHYDRAMINE HCL 50 MG
50 CAPSULE ORAL ONCE
Refills: 0 | Status: DISCONTINUED | OUTPATIENT
Start: 2023-02-05 | End: 2023-02-15

## 2023-02-05 RX ORDER — HYDROXYZINE HCL 10 MG
50 TABLET ORAL EVERY 6 HOURS
Refills: 0 | Status: DISCONTINUED | OUTPATIENT
Start: 2023-02-05 | End: 2023-02-15

## 2023-02-05 RX ORDER — HALOPERIDOL DECANOATE 100 MG/ML
5 INJECTION INTRAMUSCULAR ONCE
Refills: 0 | Status: COMPLETED | OUTPATIENT
Start: 2023-02-05 | End: 2023-02-05

## 2023-02-05 RX ORDER — LITHIUM CARBONATE 300 MG/1
300 TABLET, EXTENDED RELEASE ORAL AT BEDTIME
Refills: 0 | Status: DISCONTINUED | OUTPATIENT
Start: 2023-02-05 | End: 2023-02-14

## 2023-02-05 RX ORDER — HALOPERIDOL DECANOATE 100 MG/ML
5 INJECTION INTRAMUSCULAR EVERY 6 HOURS
Refills: 0 | Status: DISCONTINUED | OUTPATIENT
Start: 2023-02-05 | End: 2023-02-15

## 2023-02-05 RX ORDER — DIPHENHYDRAMINE HCL 50 MG
50 CAPSULE ORAL ONCE
Refills: 0 | Status: COMPLETED | OUTPATIENT
Start: 2023-02-05 | End: 2023-02-05

## 2023-02-05 RX ORDER — RISPERIDONE 4 MG/1
1 TABLET ORAL
Refills: 0 | Status: DISCONTINUED | OUTPATIENT
Start: 2023-02-05 | End: 2023-02-06

## 2023-02-05 RX ORDER — LANOLIN ALCOHOL/MO/W.PET/CERES
3 CREAM (GRAM) TOPICAL AT BEDTIME
Refills: 0 | Status: DISCONTINUED | OUTPATIENT
Start: 2023-02-05 | End: 2023-02-05

## 2023-02-05 RX ORDER — TRAZODONE HCL 50 MG
50 TABLET ORAL AT BEDTIME
Refills: 0 | Status: DISCONTINUED | OUTPATIENT
Start: 2023-02-05 | End: 2023-02-06

## 2023-02-05 RX ADMIN — Medication 50 MILLIGRAM(S): at 00:50

## 2023-02-05 RX ADMIN — Medication 50 MILLIGRAM(S): at 19:29

## 2023-02-05 RX ADMIN — HALOPERIDOL DECANOATE 5 MILLIGRAM(S): 100 INJECTION INTRAMUSCULAR at 19:29

## 2023-02-05 RX ADMIN — HALOPERIDOL DECANOATE 5 MILLIGRAM(S): 100 INJECTION INTRAMUSCULAR at 00:50

## 2023-02-05 RX ADMIN — Medication 2 MILLIGRAM(S): at 19:31

## 2023-02-05 RX ADMIN — Medication 2 MILLIGRAM(S): at 00:50

## 2023-02-05 RX ADMIN — Medication 2 MILLIGRAM(S): at 19:29

## 2023-02-05 NOTE — ED ADULT NURSE REASSESSMENT NOTE - NS ED NURSE REASSESS COMMENT FT1
Pt medicated for agitation. Pt threatening staff verbally using profanity, posturing and flailing arms towards staff, uncooperative with nursing interventions.

## 2023-02-05 NOTE — PSYCHIATRIC REHAB INITIAL EVALUATION - NSBHPRRECOMMEND_PSY_ALL_CORE
Writer met with pt on admission to assist in orienting the pt to the unit, psychiatric rehabilitation staff and departmental functions. Pt asked writer if she could see a “” and writer shared with the pt that she would let the hospital  know that she would like to receive chaplaincy services. The pt started to glare at writer intensely and state, “you’re denying my Taoist rights” although writer shared with the pt that she would connect her to a  and offered her a Bible. Pt refused and walked away. 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 Pt is a 27-year-old female with a hx of Bipolar disorder, PTSD, and Cannabis use disorder. Pt has a hx of multip[le prior psychiatric admissions. Per chart, the pt was last seen at [facility] and was discharged about 2 weeks ago. Pt admitted in the context of worsening sx of bipolar disorder in the context of manic symptoms after noncompliance with psychiatric medications. Pt is domiciled with her mother, brother, and 4-year-old son. Pt has a hx of noncompliance with medications. At this time, pt unable to engage in meaningful discussion with writer and appears with labile mood and disorganized thought process. Will continue to support the pt towards goal progress during the current stay. Pt denies SI/HI/I/P and AH/VH/TH.

## 2023-02-05 NOTE — BH INPATIENT PSYCHIATRY ASSESSMENT NOTE - NSBHCHARTREVIEWVS_PSY_A_CORE FT
Vital Signs Last 24 Hrs  T(C): 36.7 (02-05-23 @ 08:35), Max: 36.9 (02-04-23 @ 21:27)  T(F): 98 (02-05-23 @ 08:35), Max: 98.4 (02-04-23 @ 21:27)  HR: 70 (02-05-23 @ 02:00) (70 - 79)  BP: 103/76 (02-05-23 @ 02:00) (103/76 - 115/77)  BP(mean): --  RR: 16 (02-05-23 @ 02:00) (16 - 16)  SpO2: 100% (02-05-23 @ 02:00) (100% - 100%)    Orthostatic VS  02-05-23 @ 08:35  Lying BP: --/-- HR: --  Sitting BP: 117/71 HR: 81  Standing BP: 114/68 HR: 70  Site: --  Mode: --

## 2023-02-05 NOTE — BH INPATIENT PSYCHIATRY ASSESSMENT NOTE - DESCRIPTION
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

## 2023-02-05 NOTE — BH INPATIENT PSYCHIATRY ASSESSMENT NOTE - NSSUICPROTFACT_PSY_ALL_CORE
Responsibility to children, family, or others/Supportive social network of family or friends/Cultural, spiritual and/or moral attitudes against suicide/Lutheran beliefs

## 2023-02-05 NOTE — BH INPATIENT PSYCHIATRY ASSESSMENT NOTE - RISK ASSESSMENT
risk factors: non-compliance w/ treatment, social stressors, unstable housing, hx of hospitalizations, poor insight  protective: has child, is knowledgeable about MH resources, supportive family
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
## 2023-02-05 NOTE — BH INPATIENT PSYCHIATRY ASSESSMENT NOTE - ATTENDING COMMENTS
Chart reviewed.  Pt is a 27 year old woman with pphx of bipolar disorder, cannabis use disorder, and PTSD, admitted with concern for manic episode.  As per staff report, pt has exhibited disorganized and inappropriate behavior with peers on unit.  On interview pt asks about discharge and also wants a hair dryer.  Agree with assessment and plan above.

## 2023-02-05 NOTE — BH PATIENT PROFILE - FALL HARM RISK - UNIVERSAL INTERVENTIONS
Bed in lowest position, wheels locked, appropriate side rails in place/Call bell, personal items and telephone in reach/Instruct patient to call for assistance before getting out of bed or chair/Non-slip footwear when patient is out of bed/[place] to call system/Physically safe environment - no spills, clutter or unnecessary equipment/Purposeful Proactive Rounding/Room/bathroom lighting operational, light cord in reach

## 2023-02-05 NOTE — BH INPATIENT PSYCHIATRY ASSESSMENT NOTE - CURRENT MEDICATION
MEDICATIONS  (STANDING):  lithium 300 milliGRAM(s) Oral at bedtime  risperiDONE   Solution 1 milliGRAM(s) Oral two times a day    MEDICATIONS  (PRN):  haloperidol     Tablet 5 milliGRAM(s) Oral every 6 hours PRN agitation  haloperidol    Injectable 5 milliGRAM(s) IntraMuscular every 6 hours PRN Agitation  LORazepam     Tablet 2 milliGRAM(s) Oral every 6 hours PRN Agitation  LORazepam   Injectable 2 milliGRAM(s) IntraMuscular every 6 hours PRN Agitation   MEDICATIONS  (STANDING):  lithium 300 milliGRAM(s) Oral at bedtime  risperiDONE   Solution 1 milliGRAM(s) Oral two times a day    MEDICATIONS  (PRN):  haloperidol     Tablet 5 milliGRAM(s) Oral every 6 hours PRN agitation  haloperidol    Injectable 5 milliGRAM(s) IntraMuscular every 6 hours PRN Agitation  hydrOXYzine hydrochloride 50 milliGRAM(s) Oral every 6 hours PRN anxiety  LORazepam     Tablet 2 milliGRAM(s) Oral every 6 hours PRN Agitation  LORazepam   Injectable 2 milliGRAM(s) IntraMuscular every 6 hours PRN Agitation  traZODone 50 milliGRAM(s) Oral at bedtime PRN insomnia

## 2023-02-05 NOTE — PSYCHIATRIC REHAB INITIAL EVALUATION - NSBHEMPLOYED_PSY_ALL_CORE
pt currently unemployed, however reported that she has "worked since I was 13 years old." And that she has "worked all over."

## 2023-02-05 NOTE — ED BEHAVIORAL HEALTH NOTE - BEHAVIORAL HEALTH NOTE
COVID Exposure Screen- Patient        1. *Have you had a COVID-19 test in the last 90 days? ( x) Yes ( ) No () Unknown- Reason: _____    IF YES PROCEED TO QUESTION #2. IF NO OR UNKNOWN, PLEASE SKIP TO QUESTION #3.    2. Date of test(s) and result(s): ________    3. *Have you tested positive for COVID-19 antibodies? ( ) Yes ( ) No ( ) Unknown- Reason: _____    IF YES PROCEED TO QUESTION #4. IF NO or UNKNOWN, PLEASE SKIP TO QUESTION #5.    4. Date of positive antibody test: ________    5. *Have you received 2 doses of the COVID-19 vaccine? (x ) Yes ( ) No ( ) Unknown- Reason: _____    IF YES PROCEED TO QUESTION #6. IF NO or UNKNOWN, PLEASE SKIP TO QUESTION #7.    6. Date of second dose: ________    7. *In the past 10 days, have you been around anyone with a positive COVID-19 test?* ( ) Yes ( ) No ( ) Unknown- Reason: ____    IF YES PROCEED TO QUESTION #8. IF NO or UNKNOWN, PLEASE SKIP TO QUESTION #13.    8. Were you within 6 feet of them for at least 15 minutes? ( ) Yes ( ) No ( x) Unknown- Reason: _____    9. Have you provided care for them? ( ) Yes ( ) No ( ) Unknown- Reason: ______    10. Have you had direct physical contact with them (touched, hugged, or kissed them)? ( ) Yes ( ) No ( ) Unknown- Reason: _____    11. Have you shared eating or drinking utensils with them? ( ) Yes ( ) No ( ) Unknown- Reason: ____    12. Have they sneezed, coughed, or somehow gotten respiratory droplets on you? ( ) Yes ( ) No ( ) Unknown- Reason: ______    13. *Have you been out of [place] within the past 10 days?* ( ) Yes ( x) No ( ) Unknown- Reason: _____    IF YES PLEASE ANSWER THE FOLLOWING QUESTIONS:    14. Which state/country have you been to? ______    15. Were you there over 24 hours? ( ) Yes ( ) No ( ) Unknown- Reason: ______    16. Date of return to [facility]: ______

## 2023-02-05 NOTE — BH INPATIENT PSYCHIATRY ASSESSMENT NOTE - NSBHMETABOLIC_PSY_ALL_CORE_FT
BMI: BMI (kg/m2): 60.4 (02-05-23 @ 08:35)  HbA1c: A1C with Estimated Average Glucose Result: 5.0 % (01-14-23 @ 08:25)    Glucose: POCT Blood Glucose.: 103 mg/dL (02-04-23 @ 21:34)    BP: 103/76 (02-05-23 @ 02:00) (103/76 - 115/77)  Lipid Panel: Date/Time: 01-14-23 @ 08:25  Cholesterol, Serum: 199  Direct LDL: --  HDL Cholesterol, Serum: 90  Total Cholesterol/HDL Ration Measurement: --  Triglycerides, Serum: 44

## 2023-02-05 NOTE — BH INPATIENT PSYCHIATRY ASSESSMENT NOTE - NSBHASSESSSUMMFT_PSY_ALL_CORE
27 year old single  AA female.  Patient has PPH of Bipolar Disorder, PTSD  and Cannabis Use Disorder.   Patient has prior multiple inpatient hospitalizations.  Patient last hospitalized at [facility] with recent dc 2 weeks ago.  Patient is now hospitalized with a primary problem of worsening symptoms of Bipolar susanna in context of noncompliance with medications.  Patient admitted to [facility] on a 9.39 legal status.   Patient requires inpatient hospitalization due to symptoms of mental illness so severe that they significantly interfere with activities of daily living, and presents a potential danger to self as a result of psychotic decompensation, susanna. She is requiring 24-hour care at this time as a result, for psychiatric stabilization and safety.    Plan:  >Legal: 9.39  >Obs: Routine, no current SI. no need for CO, patient not expected to pose risk to self or others in controlled inpatient setting  >Psychiatric Meds: Restart outpatient medication regimen: Lithium 300mg qhs, Risperdal 1mg BID. Observe for tolerability and efficacy. Patient had been poorly adherent prior to admission.     PRN medications:  Ativan 2mg oral Q6HR PRN for agitation and anxiety.  Haldol 5mg oral Q6HR PRN for agitation.   Benadryl 50mg oral Q6HR PRN for agitation.   Vistaril 50mg oral Q6HR PRN for anxiety.  Desyrel 50mg oral QHS PRN for insomnia.   >Labs: Admission labs reviewed, no acute findings. U-tox pending. Labs pending for tomorrow: A1c and Lipid panel. Primary team to determine if they want repeat labs. Hold antipsychotics if QTc >500  >Medical:   No acute concerns. No consultations needed at this time. No indication for CIWA. Patient with consistently stable VS, no visible physical symptoms of withdrawal.   During the course of treatment, will collaborate with medical team to manage medical issues.  >Diet: Regular  >Social: milieu/structured therapy  >Treatment Interventions: Groups and Individual Therapy/CBT, Motivational counseling for substance abuse related issues.   >Dispo: Collateral and dispo planning pending further symptom and medication optimization     27 year old single  AA female.  Patient has PPH of Bipolar Disorder, PTSD  and Cannabis Use Disorder.   Patient has prior multiple inpatient hospitalizations.  Patient last hospitalized at [facility] with recent dc 2 weeks ago.  Patient is now hospitalized with a primary problem of worsening symptoms of Bipolar susanna in context of noncompliance with medications.  Patient admitted to [facility] on a 9.39 legal status.   Patient requires inpatient hospitalization due to symptoms of mental illness so severe that they significantly interfere with activities of daily living, and presents a potential danger to self as a result of psychotic decompensation, susanna. She is requiring 24-hour care at this time as a result, for psychiatric stabilization and safety.    Plan:  >Legal: 9.39  >Obs: Routine, no current SI. no need for CO, patient not expected to pose risk to self or others in controlled inpatient setting  >Psychiatric Meds: Restart outpatient medication regimen: Lithium 300mg qhs, Risperdal 1mg BID. Observe for tolerability and efficacy. Patient had been poorly adherent prior to admission.     PRN medications:  Ativan 2mg oral Q6HR PRN for agitation and anxiety.  Haldol 5mg oral Q6HR PRN for agitation.   Benadryl 50mg oral Q6HR PRN for agitation.   Vistaril 50mg oral Q6HR PRN for anxiety.  Desyrel 50mg oral QHS PRN for insomnia.   >Labs: Admission labs reviewed, no acute findings. U-tox pending. Primary team to determine if they want repeat labs. Hold antipsychotics if QTc >500  >Medical:   No acute concerns. No consultations needed at this time. No indication for CIWA. Patient with consistently stable VS, no visible physical symptoms of withdrawal.   During the course of treatment, will collaborate with medical team to manage medical issues.  >Diet: Regular  >Social: milieu/structured therapy  >Treatment Interventions: Groups and Individual Therapy/CBT, Motivational counseling for substance abuse related issues.   >Dispo: Collateral and dispo planning pending further symptom and medication optimization

## 2023-02-05 NOTE — BH CHART NOTE - NSEVENTNOTEFT_PSY_ALL_CORE
RAFAEL called for activation of IM medication due to patient agitation. Psych emergency called at 6:43 pm. Per staff patient has been agitated, threatening staff, unable to be redirected. Patient took PO Haldol 5 mg x1, Ativan 2 mg x1, and Atarax 50 mg x1.

## 2023-02-05 NOTE — ED BEHAVIORAL HEALTH NOTE - BEHAVIORAL HEALTH NOTE
tova bernardo contacted insurance number [phone] , [phone], [phone] spoke with Pita Buenrostro, and Shelby who stated that the pt was not coming up under the plan and the pt plan had  in  and

## 2023-02-05 NOTE — BH INPATIENT PSYCHIATRY ASSESSMENT NOTE - NSTXMANICGOAL_PSY_ALL_CORE
Be able to identify the early signs of susanna (e.g. sleep and mood changes) and to employ coping strategies to minimize acting out

## 2023-02-05 NOTE — BH INPATIENT PSYCHIATRY ASSESSMENT NOTE - NSICDXBHSECONDARYDX_PSY_ALL_CORE
Bipolar disorder, current episode manic severe with psychotic features   F31.2  Noncompliance with medications   Z91.14  Cannabis abuse   F12.10  Post traumatic stress disorder (PTSD)   F43.10

## 2023-02-06 PROCEDURE — 99233 SBSQ HOSP IP/OBS HIGH 50: CPT

## 2023-02-06 RX ORDER — LITHIUM CARBONATE 300 MG/1
300 TABLET, EXTENDED RELEASE ORAL ONCE
Refills: 0 | Status: COMPLETED | OUTPATIENT
Start: 2023-02-06 | End: 2023-02-06

## 2023-02-06 RX ORDER — TRAZODONE HCL 50 MG
50 TABLET ORAL AT BEDTIME
Refills: 0 | Status: DISCONTINUED | OUTPATIENT
Start: 2023-02-06 | End: 2023-02-10

## 2023-02-06 RX ADMIN — HALOPERIDOL DECANOATE 5 MILLIGRAM(S): 100 INJECTION INTRAMUSCULAR at 20:08

## 2023-02-06 RX ADMIN — LITHIUM CARBONATE 300 MILLIGRAM(S): 300 TABLET, EXTENDED RELEASE ORAL at 20:07

## 2023-02-06 RX ADMIN — HALOPERIDOL DECANOATE 5 MILLIGRAM(S): 100 INJECTION INTRAMUSCULAR at 02:26

## 2023-02-06 RX ADMIN — Medication 1 MILLIGRAM(S): at 20:07

## 2023-02-06 RX ADMIN — Medication 1 MILLIGRAM(S): at 12:04

## 2023-02-06 RX ADMIN — Medication 50 MILLIGRAM(S): at 23:45

## 2023-02-06 RX ADMIN — LITHIUM CARBONATE 300 MILLIGRAM(S): 300 TABLET, EXTENDED RELEASE ORAL at 12:05

## 2023-02-06 RX ADMIN — Medication 50 MILLIGRAM(S): at 20:07

## 2023-02-06 RX ADMIN — Medication 2 MILLIGRAM(S): at 09:10

## 2023-02-06 RX ADMIN — Medication 2 MILLIGRAM(S): at 23:45

## 2023-02-06 RX ADMIN — Medication 50 MILLIGRAM(S): at 02:23

## 2023-02-06 RX ADMIN — Medication 2 MILLIGRAM(S): at 02:24

## 2023-02-06 NOTE — BH INPATIENT PSYCHIATRY PROGRESS NOTE - NSBHASSESSSUMMFT_PSY_ALL_CORE
27 year old single  AA female.  Patient has PPH of Bipolar Disorder, PTSD  and Cannabis Use Disorder.   Patient has prior multiple inpatient hospitalizations.  Patient last hospitalized at [facility] with recent dc 2 weeks ago.  Patient is now hospitalized with a primary problem of worsening symptoms of Bipolar susanna in context of noncompliance with medications.  Patient admitted to [facility] on a 9.39 legal status.   Patient requires inpatient hospitalization due to symptoms of mental illness so severe that they significantly interfere with activities of daily living, and presents a potential danger to self as a result of psychotic decompensation, susanna. She is requiring 24-hour care at this time as a result, for psychiatric stabilization and safety.    Plan:  >Legal: 9.39  >Obs: Routine, no current SI. no need for CO, patient not expected to pose risk to self or others in controlled inpatient setting  >Psychiatric Meds: readjust med regimen to lithium 300 twice daily, ativan one mg three times daily, trazodone 50mg at bedtime    PRN medications:  Ativan 2mg oral Q6HR PRN for agitation and anxiety.  Haldol 5mg oral Q6HR PRN for agitation.   Benadryl 50mg oral Q6HR PRN for agitation.   Vistaril 50mg oral Q6HR PRN for anxiety.  Desyrel 50mg oral QHS PRN for insomnia.   >Labs: Admission labs reviewed, no acute findings. No utox.  BAL 21.    >Medical:   No acute concerns. No consultations needed at this time. No indication for CIWA. Patient with consistently stable VS, no visible physical symptoms of withdrawal.   During the course of treatment, will collaborate with medical team to manage medical issues.  >Diet: Regular  >Social: milieu/structured therapy  >Treatment Interventions: Groups and Individual Therapy/CBT, Motivational counseling for substance abuse related issues.   >Dispo: Collateral and dispo planning pending further symptom and medication optimization

## 2023-02-06 NOTE — BH INPATIENT PSYCHIATRY PROGRESS NOTE - CURRENT MEDICATION
MEDICATIONS  (STANDING):  lithium 300 milliGRAM(s) Oral at bedtime  LORazepam     Tablet 1 milliGRAM(s) Oral three times a day  traZODone 50 milliGRAM(s) Oral at bedtime    MEDICATIONS  (PRN):  diphenhydrAMINE Injectable 50 milliGRAM(s) IntraMuscular once PRN Agitation  haloperidol     Tablet 5 milliGRAM(s) Oral every 6 hours PRN agitation  haloperidol    Injectable 5 milliGRAM(s) IntraMuscular every 6 hours PRN Agitation  haloperidol    Injectable 5 milliGRAM(s) IntraMuscular every 6 hours PRN Agitation  hydrOXYzine hydrochloride 50 milliGRAM(s) Oral every 6 hours PRN anxiety  LORazepam     Tablet 2 milliGRAM(s) Oral every 6 hours PRN Agitation  LORazepam   Injectable 2 milliGRAM(s) IntraMuscular every 6 hours PRN Agitation  LORazepam   Injectable 2 milliGRAM(s) IntraMuscular every 6 hours PRN Agitation

## 2023-02-06 NOTE — BH INPATIENT PSYCHIATRY PROGRESS NOTE - PRN MEDS
MEDICATIONS  (PRN):  diphenhydrAMINE Injectable 50 milliGRAM(s) IntraMuscular once PRN Agitation  haloperidol     Tablet 5 milliGRAM(s) Oral every 6 hours PRN agitation  haloperidol    Injectable 5 milliGRAM(s) IntraMuscular every 6 hours PRN Agitation  haloperidol    Injectable 5 milliGRAM(s) IntraMuscular every 6 hours PRN Agitation  hydrOXYzine hydrochloride 50 milliGRAM(s) Oral every 6 hours PRN anxiety  LORazepam     Tablet 2 milliGRAM(s) Oral every 6 hours PRN Agitation  LORazepam   Injectable 2 milliGRAM(s) IntraMuscular every 6 hours PRN Agitation  LORazepam   Injectable 2 milliGRAM(s) IntraMuscular every 6 hours PRN Agitation

## 2023-02-06 NOTE — BH INPATIENT PSYCHIATRY PROGRESS NOTE - NSBHCHARTREVIEWVS_PSY_A_CORE FT
Vital Signs Last 24 Hrs  T(C): 36.8 (02-06-23 @ 07:49), Max: 36.8 (02-06-23 @ 07:49)  T(F): 98.2 (02-06-23 @ 07:49), Max: 98.2 (02-06-23 @ 07:49)      Orthostatic VS  02-06-23 @ 07:49  Lying BP: --/-- HR: --  Sitting BP: 117/80 HR: 108  Standing BP: --/-- HR: --  Site: --  Mode: --  Orthostatic VS  02-05-23 @ 20:09  Lying BP: --/-- HR: --  Sitting BP: 130/84 HR: 80  Standing BP: 120/70 HR: 69  Site: --  Mode: --  Orthostatic VS  02-05-23 @ 08:35  Lying BP: --/-- HR: --  Sitting BP: 117/71 HR: 81  Standing BP: 114/68 HR: 70  Site: --  Mode: --

## 2023-02-06 NOTE — BH INPATIENT PSYCHIATRY PROGRESS NOTE - OTHER
limited persecutory orientation towards some family members, friends intense at times patient is somewhat irritable but cooperates although this is limited increased rate, productivity, somewhat increased voice

## 2023-02-07 PROCEDURE — 99232 SBSQ HOSP IP/OBS MODERATE 35: CPT

## 2023-02-07 RX ADMIN — Medication 2 MILLIGRAM(S): at 15:24

## 2023-02-07 RX ADMIN — Medication 1 MILLIGRAM(S): at 20:46

## 2023-02-07 RX ADMIN — Medication 1 MILLIGRAM(S): at 12:08

## 2023-02-07 RX ADMIN — LITHIUM CARBONATE 300 MILLIGRAM(S): 300 TABLET, EXTENDED RELEASE ORAL at 20:45

## 2023-02-07 RX ADMIN — HALOPERIDOL DECANOATE 5 MILLIGRAM(S): 100 INJECTION INTRAMUSCULAR at 09:53

## 2023-02-07 RX ADMIN — Medication 1 MILLIGRAM(S): at 08:07

## 2023-02-07 RX ADMIN — Medication 50 MILLIGRAM(S): at 20:46

## 2023-02-07 RX ADMIN — HALOPERIDOL DECANOATE 5 MILLIGRAM(S): 100 INJECTION INTRAMUSCULAR at 21:45

## 2023-02-07 RX ADMIN — Medication 50 MILLIGRAM(S): at 09:53

## 2023-02-07 NOTE — BH INPATIENT PSYCHIATRY PROGRESS NOTE - NSBHASSESSSUMMFT_PSY_ALL_CORE
27 year old single  AA female.  Patient has PPH of Bipolar Disorder, PTSD  and Cannabis Use Disorder.   Patient has prior multiple inpatient hospitalizations.  Patient last hospitalized at [facility] with recent dc 2 weeks ago.  Patient is now hospitalized with a primary problem of worsening symptoms of Bipolar susanna in context of noncompliance with medications.  Patient admitted to [facility] on a 9.39 legal status.   Patient requires inpatient hospitalization due to symptoms of mental illness so severe that they significantly interfere with activities of daily living, and presents a potential danger to self as a result of psychotic decompensation, susanna. She is requiring 24-hour care at this time as a result, for psychiatric stabilization and safety.  More sullen presentation today, placed written quest for discahrge.  Though compliant with medications, unable to care for self.      Plan:  >Legal: 9.39  >Obs: Routine, no current SI. no need for CO, patient not expected to pose risk to self or others in controlled inpatient setting  >Psychiatric Meds: readjust med regimen to lithium 300 twice daily, ativan one mg three times daily, trazodone 50mg at bedtime    PRN medications:  Ativan 2mg oral Q6HR PRN for agitation and anxiety.  Haldol 5mg oral Q6HR PRN for agitation.   Benadryl 50mg oral Q6HR PRN for agitation.   Vistaril 50mg oral Q6HR PRN for anxiety.  Desyrel 50mg oral QHS PRN for insomnia.   >Labs: Admission labs reviewed, no acute findings. No utox.  BAL 21.    >Medical:   No acute concerns. No consultations needed at this time. No indication for CIWA. Patient with consistently stable VS, no visible physical symptoms of withdrawal.   During the course of treatment, will collaborate with medical team to manage medical issues.  >Diet: Regular  >Social: milieu/structured therapy  >Treatment Interventions: Groups and Individual Therapy/CBT, Motivational counseling for substance abuse related issues.   >Dispo: Collateral and dispo planning pending further symptom and medication optimization

## 2023-02-07 NOTE — BH SOCIAL WORK INITIAL PSYCHOSOCIAL EVALUATION - OTHER PAST PSYCHIATRIC HISTORY (INCLUDE DETAILS REGARDING ONSET, COURSE OF ILLNESS, INPATIENT/OUTPATIENT TREATMENT)
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 Patient has a history of Bipolar Disorder, PTSD, and cannabis use disorder. Patient has multiple inpatient hospitalizations, most recently at [facility] 2 weeks ago. Patient has a history of noncompliance with medications. She sees Dr. David Garcia in the community, currently does not see a therapist. Patient has a history of trauma. Patient has no known legal history. Patient was brought to the ED by her cousin for worsening susanna. Patient was recently brought to [facility] by her close friend (Debra) however left after having to wait to be seen, also for worsening susanna. Patient reportedly discharged from [facility] with some residual symptoms, and family feels she was not ready and still was elevated. Since her release patient reportedly has been irritable and on edge, and quit her job which was a surprise to family and out of character.

## 2023-02-07 NOTE — BH INPATIENT PSYCHIATRY PROGRESS NOTE - NSBHCHARTREVIEWVS_PSY_A_CORE FT
Vital Signs Last 24 Hrs  T(C): 36.7 (02-07-23 @ 06:28), Max: 36.8 (02-06-23 @ 20:30)  T(F): 98 (02-07-23 @ 06:28), Max: 98.3 (02-06-23 @ 20:30)  HR: 90 (02-06-23 @ 20:30) (90 - 90)  BP: 107/69 (02-06-23 @ 20:30) (107/69 - 107/69)  BP(mean): --  RR: --  SpO2: --    Orthostatic VS  02-07-23 @ 06:28  Lying BP: --/-- HR: --  Sitting BP: 118/78 HR: 92  Standing BP: 122/82 HR: 104  Site: upper left arm  Mode: electronic  Orthostatic VS  02-06-23 @ 07:49  Lying BP: --/-- HR: --  Sitting BP: 117/80 HR: 108  Standing BP: --/-- HR: --  Site: --  Mode: --  Orthostatic VS  02-05-23 @ 20:09  Lying BP: --/-- HR: --  Sitting BP: 130/84 HR: 80  Standing BP: 120/70 HR: 69  Site: --  Mode: --

## 2023-02-07 NOTE — BH INPATIENT PSYCHIATRY PROGRESS NOTE - NSBHMETABOLIC_PSY_ALL_CORE_FT
BMI: BMI (kg/m2): 60.4 (02-05-23 @ 08:35)  HbA1c: A1C with Estimated Average Glucose Result: 5.0 % (01-14-23 @ 08:25)    Glucose: POCT Blood Glucose.: 103 mg/dL (02-04-23 @ 21:34)    BP: 107/69 (02-06-23 @ 20:30) (103/76 - 115/77)  Lipid Panel: Date/Time: 01-14-23 @ 08:25  Cholesterol, Serum: 199  Direct LDL: --  HDL Cholesterol, Serum: 90  Total Cholesterol/HDL Ration Measurement: --  Triglycerides, Serum: 44

## 2023-02-07 NOTE — BH INPATIENT PSYCHIATRY PROGRESS NOTE - OTHER
patient is somewhat irritable but cooperates although this is limited intense at times improved--no throwing objects off of desk today limited selective mutism persecutory orientation towards

## 2023-02-08 PROCEDURE — 99233 SBSQ HOSP IP/OBS HIGH 50: CPT

## 2023-02-08 RX ORDER — DIPHENHYDRAMINE HCL 50 MG
25 CAPSULE ORAL ONCE
Refills: 0 | Status: COMPLETED | OUTPATIENT
Start: 2023-02-08 | End: 2023-02-08

## 2023-02-08 RX ORDER — RISPERIDONE 4 MG/1
1 TABLET ORAL
Refills: 0 | Status: DISCONTINUED | OUTPATIENT
Start: 2023-02-08 | End: 2023-02-10

## 2023-02-08 RX ADMIN — Medication 1 MILLIGRAM(S): at 13:47

## 2023-02-08 RX ADMIN — LITHIUM CARBONATE 300 MILLIGRAM(S): 300 TABLET, EXTENDED RELEASE ORAL at 20:24

## 2023-02-08 RX ADMIN — Medication 1 MILLIGRAM(S): at 08:07

## 2023-02-08 RX ADMIN — RISPERIDONE 1 MILLIGRAM(S): 4 TABLET ORAL at 20:24

## 2023-02-08 RX ADMIN — Medication 25 MILLIGRAM(S): at 06:17

## 2023-02-08 RX ADMIN — Medication 50 MILLIGRAM(S): at 20:24

## 2023-02-08 RX ADMIN — Medication 1 MILLIGRAM(S): at 20:24

## 2023-02-08 NOTE — BH INPATIENT PSYCHIATRY PROGRESS NOTE - CURRENT MEDICATION
MEDICATIONS  (STANDING):  lithium 300 milliGRAM(s) Oral at bedtime  LORazepam     Tablet 1 milliGRAM(s) Oral three times a day  risperiDONE   Tablet 1 milliGRAM(s) Oral two times a day  traZODone 50 milliGRAM(s) Oral at bedtime    MEDICATIONS  (PRN):  diphenhydrAMINE Injectable 50 milliGRAM(s) IntraMuscular once PRN Agitation  haloperidol     Tablet 5 milliGRAM(s) Oral every 6 hours PRN agitation  haloperidol    Injectable 5 milliGRAM(s) IntraMuscular every 6 hours PRN Agitation  haloperidol    Injectable 5 milliGRAM(s) IntraMuscular every 6 hours PRN Agitation  hydrOXYzine hydrochloride 50 milliGRAM(s) Oral every 6 hours PRN anxiety  LORazepam     Tablet 2 milliGRAM(s) Oral every 6 hours PRN Agitation  LORazepam   Injectable 2 milliGRAM(s) IntraMuscular every 6 hours PRN Agitation  LORazepam   Injectable 2 milliGRAM(s) IntraMuscular every 6 hours PRN Agitation

## 2023-02-08 NOTE — BH INPATIENT PSYCHIATRY PROGRESS NOTE - NSBHASSESSSUMMFT_PSY_ALL_CORE
27 year old single  AA female.  Patient has PPH of Bipolar Disorder, PTSD  and Cannabis Use Disorder.   Patient has prior multiple inpatient hospitalizations.  Patient last hospitalized at [facility] with recent dc 2 weeks ago.  Patient is now hospitalized with a primary problem of worsening symptoms of Bipolar susanna in context of noncompliance with medications.  Patient admitted to [facility] on a 9.39 legal status.   Patient requires inpatient hospitalization due to symptoms of mental illness so severe that they significantly interfere with activities of daily living, and presents a potential danger to self as a result of psychotic decompensation, susanna. She is requiring 24-hour care at this time as a result, for psychiatric stabilization and safety.    She is somewhat guarded, somewhat irritable, injustice collecting, splitting.   She is brusque and alternately tries to control situations with subtle intimidation or by presenting herself as victimized.  She becomes disorganized briefly at times.  She becomes verbally abusive at times.  Mother reports others call her relating she is making inappropriate phone calls and so forth while on the unit.  She is ostensibly compliant with lithium but is clear that she does not want to accept lithium.  She is more favorably inclined towards ativan.  She has received invega RAMSAY in the past and per mother tolerated it well with good effect.  Given that patient desires prompt discharge and lithium known to take time for effectiveness and given that she has done well on risperidone/invega in past will add risperidone one mg twice daily.  Patient voices agreement at least superficially.  We will continue ativan one mg three times daily, lithium 300mg twice daily, add risperidone one mg twice daily and also write for mouth checks.

## 2023-02-08 NOTE — BH INPATIENT PSYCHIATRY PROGRESS NOTE - OTHER
intense at times persecutory orientation; injustice collecting at times;  improved--no throwing objects off of desk today brusjaylan, forceful neutral to irritable guarded, superficially cooperative limited purposeful at times becomes disorganized terse

## 2023-02-08 NOTE — BH INPATIENT PSYCHIATRY PROGRESS NOTE - NSBHMETABOLIC_PSY_ALL_CORE_FT
BMI: BMI (kg/m2): 60.4 (02-05-23 @ 08:35)  HbA1c: A1C with Estimated Average Glucose Result: 5.0 % (01-14-23 @ 08:25)    Glucose: POCT Blood Glucose.: 103 mg/dL (02-04-23 @ 21:34)    BP: 107/69 (02-06-23 @ 20:30) (107/69 - 107/69)  Lipid Panel: Date/Time: 01-14-23 @ 08:25  Cholesterol, Serum: 199  Direct LDL: --  HDL Cholesterol, Serum: 90  Total Cholesterol/HDL Ration Measurement: --  Triglycerides, Serum: 44

## 2023-02-08 NOTE — BH INPATIENT PSYCHIATRY PROGRESS NOTE - NSBHCHARTREVIEWVS_PSY_A_CORE FT
Vital Signs Last 24 Hrs  T(C): 36.7 (02-08-23 @ 06:15), Max: 36.7 (02-08-23 @ 06:15)  T(F): 98 (02-08-23 @ 06:15), Max: 98 (02-08-23 @ 06:15)  HR: --  BP: --  BP(mean): --  RR: --  SpO2: --    Orthostatic VS  02-08-23 @ 06:15  Lying BP: --/-- HR: --  Sitting BP: 123/99 HR: 96  Standing BP: 131/100 HR: 89  Site: --  Mode: --  Orthostatic VS  02-07-23 @ 06:28  Lying BP: --/-- HR: --  Sitting BP: 118/78 HR: 92  Standing BP: 122/82 HR: 104  Site: upper left arm  Mode: electronic

## 2023-02-09 PROCEDURE — 99232 SBSQ HOSP IP/OBS MODERATE 35: CPT

## 2023-02-09 RX ORDER — METRONIDAZOLE 500 MG
500 TABLET ORAL
Refills: 0 | Status: DISCONTINUED | OUTPATIENT
Start: 2023-02-09 | End: 2023-02-15

## 2023-02-09 RX ADMIN — Medication 1 MILLIGRAM(S): at 20:40

## 2023-02-09 RX ADMIN — Medication 50 MILLIGRAM(S): at 21:29

## 2023-02-09 RX ADMIN — HALOPERIDOL DECANOATE 5 MILLIGRAM(S): 100 INJECTION INTRAMUSCULAR at 21:29

## 2023-02-09 RX ADMIN — Medication 1 MILLIGRAM(S): at 09:37

## 2023-02-09 RX ADMIN — RISPERIDONE 1 MILLIGRAM(S): 4 TABLET ORAL at 20:40

## 2023-02-09 RX ADMIN — Medication 1 MILLIGRAM(S): at 12:24

## 2023-02-09 RX ADMIN — RISPERIDONE 1 MILLIGRAM(S): 4 TABLET ORAL at 09:38

## 2023-02-09 RX ADMIN — LITHIUM CARBONATE 300 MILLIGRAM(S): 300 TABLET, EXTENDED RELEASE ORAL at 20:40

## 2023-02-09 RX ADMIN — Medication 2 MILLIGRAM(S): at 22:51

## 2023-02-09 RX ADMIN — Medication 50 MILLIGRAM(S): at 20:40

## 2023-02-09 RX ADMIN — Medication 500 MILLIGRAM(S): at 20:40

## 2023-02-09 NOTE — BH INPATIENT PSYCHIATRY PROGRESS NOTE - NSBHCHARTREVIEWVS_PSY_A_CORE FT
Vital Signs Last 24 Hrs  T(C): 36.6 (02-09-23 @ 07:57), Max: 36.7 (02-08-23 @ 20:04)  T(F): 97.9 (02-09-23 @ 07:57), Max: 98.1 (02-08-23 @ 20:04)  HR: 91 (02-09-23 @ 07:57) (91 - 91)  BP: 124/82 (02-09-23 @ 07:57) (124/82 - 124/82)  BP(mean): --  RR: 18 (02-08-23 @ 22:45) (18 - 18)  SpO2: --    Orthostatic VS  02-08-23 @ 22:45  Lying BP: --/-- HR: --  Sitting BP: 135/79 HR: 110  Standing BP: 115/72 HR: 118  Site: --  Mode: --  Orthostatic VS  02-08-23 @ 20:04  Lying BP: --/-- HR: --  Sitting BP: 142/92 HR: 108  Standing BP: --/-- HR: --  Site: --  Mode: --  Orthostatic VS  02-08-23 @ 06:15  Lying BP: --/-- HR: --  Sitting BP: 123/99 HR: 96  Standing BP: 131/100 HR: 89  Site: --  Mode: --   Vital Signs Last 24 Hrs  T(C): 36.6 (02-09-23 @ 07:57), Max: 36.6 (02-08-23 @ 22:45)  T(F): 97.9 (02-09-23 @ 07:57), Max: 97.9 (02-09-23 @ 07:57)  HR: 91 (02-09-23 @ 07:57) (91 - 91)  BP: 124/82 (02-09-23 @ 07:57) (124/82 - 124/82)  BP(mean): --  RR: 18 (02-08-23 @ 22:45) (18 - 18)  SpO2: --    Orthostatic VS  02-08-23 @ 22:45  Lying BP: --/-- HR: --  Sitting BP: 135/79 HR: 110  Standing BP: 115/72 HR: 118  Site: --  Mode: --  Orthostatic VS  02-08-23 @ 20:04  Lying BP: --/-- HR: --  Sitting BP: 142/92 HR: 108  Standing BP: --/-- HR: --  Site: --  Mode: --  Orthostatic VS  02-08-23 @ 06:15  Lying BP: --/-- HR: --  Sitting BP: 123/99 HR: 96  Standing BP: 131/100 HR: 89  Site: --  Mode: --

## 2023-02-09 NOTE — BH INPATIENT PSYCHIATRY PROGRESS NOTE - CURRENT MEDICATION
MEDICATIONS  (STANDING):  lithium 300 milliGRAM(s) Oral at bedtime  LORazepam     Tablet 1 milliGRAM(s) Oral three times a day  metroNIDAZOLE    Tablet 500 milliGRAM(s) Oral two times a day  risperiDONE   Tablet 1 milliGRAM(s) Oral two times a day  traZODone 50 milliGRAM(s) Oral at bedtime    MEDICATIONS  (PRN):  diphenhydrAMINE Injectable 50 milliGRAM(s) IntraMuscular once PRN Agitation  haloperidol     Tablet 5 milliGRAM(s) Oral every 6 hours PRN agitation  haloperidol    Injectable 5 milliGRAM(s) IntraMuscular every 6 hours PRN Agitation  hydrOXYzine hydrochloride 50 milliGRAM(s) Oral every 6 hours PRN anxiety  LORazepam     Tablet 2 milliGRAM(s) Oral every 6 hours PRN Agitation  LORazepam   Injectable 2 milliGRAM(s) IntraMuscular every 6 hours PRN Agitation

## 2023-02-09 NOTE — BH INPATIENT PSYCHIATRY PROGRESS NOTE - PRN MEDS
MEDICATIONS  (PRN):  diphenhydrAMINE Injectable 50 milliGRAM(s) IntraMuscular once PRN Agitation  haloperidol     Tablet 5 milliGRAM(s) Oral every 6 hours PRN agitation  haloperidol    Injectable 5 milliGRAM(s) IntraMuscular every 6 hours PRN Agitation  hydrOXYzine hydrochloride 50 milliGRAM(s) Oral every 6 hours PRN anxiety  LORazepam     Tablet 2 milliGRAM(s) Oral every 6 hours PRN Agitation  LORazepam   Injectable 2 milliGRAM(s) IntraMuscular every 6 hours PRN Agitation
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
## 2023-02-09 NOTE — BH INPATIENT PSYCHIATRY PROGRESS NOTE - OTHER
brusjaylan, forceful persecutory orientation; injustice collecting at times;  guarded, superficially cooperative improved--no throwing objects off of desk today neutral to irritable intense at times purposeful at times becomes disorganized limited terse

## 2023-02-09 NOTE — CHART NOTE - NSCHARTNOTEFT_GEN_A_CORE
Contacted by primary team for reported vaginal odor without discharge. Primary team concerned for bacterial vaginosis.     May proceed with empiric tx of BV w/metronidazole 500mg PO twice daily x7 days.

## 2023-02-09 NOTE — BH INPATIENT PSYCHIATRY PROGRESS NOTE - NSBHMETABOLIC_PSY_ALL_CORE_FT
BMI: BMI (kg/m2): 60.4 (02-05-23 @ 08:35)  HbA1c: A1C with Estimated Average Glucose Result: 5.0 % (01-14-23 @ 08:25)    Glucose: POCT Blood Glucose.: 103 mg/dL (02-04-23 @ 21:34)    BP: 124/82 (02-09-23 @ 07:57) (107/69 - 124/82)  Lipid Panel: Date/Time: 01-14-23 @ 08:25  Cholesterol, Serum: 199  Direct LDL: --  HDL Cholesterol, Serum: 90  Total Cholesterol/HDL Ration Measurement: --  Triglycerides, Serum: 44   BMI: BMI (kg/m2): 60.4 (02-05-23 @ 08:35)  HbA1c: A1C with Estimated Average Glucose Result: 5.0 % (01-14-23 @ 08:25)    Glucose: POCT Blood Glucose.: 103 mg/dL (02-04-23 @ 21:34)    BP: 124/82 (02-09-23 @ 07:57) (124/82 - 124/82)  Lipid Panel: Date/Time: 01-14-23 @ 08:25  Cholesterol, Serum: 199  Direct LDL: --  HDL Cholesterol, Serum: 90  Total Cholesterol/HDL Ration Measurement: --  Triglycerides, Serum: 44

## 2023-02-09 NOTE — BH INPATIENT PSYCHIATRY PROGRESS NOTE - NSBHASSESSSUMMFT_PSY_ALL_CORE
27 year old single  AA female.  Patient has PPH of Bipolar Disorder, PTSD  and Cannabis Use Disorder.   Patient has prior multiple inpatient hospitalizations.  Patient last hospitalized at [facility] with recent dc 2 weeks ago.  Patient is now hospitalized with a primary problem of worsening symptoms of Bipolar susanna in context of noncompliance with medications.  Patient admitted to [facility] on a 9.39 legal status.   Patient requires inpatient hospitalization due to symptoms of mental illness so severe that they significantly interfere with activities of daily living, and presents a potential danger to self as a result of psychotic decompensation, susanna. She is requiring 24-hour care at this time as a result, for psychiatric stabilization and safety.    On assessment today patient was irritable, elevated, hyperverbal. Patient doesn't understand why she was transferred and when explains behaved as if she was unsure of information. Patient denied SI/I/P, HI, AH, VH.    Plan:   - Transfer to [facility], 9.39   -No Co needed, routine observation, denied SI/I/P.   -Psychiatry: Continue Lithium 300mg at bedtime, Ativan 1mg TIB, Risperdal 1mg BID.   -Medical : Patient denied any medical history.   -Group and milieu therapy  -Dispo as per social work

## 2023-02-09 NOTE — BH INPATIENT PSYCHIATRY PROGRESS NOTE - NSBHATTESTBILLING_PSY_A_CORE
60100-Nccenaleac OBS or IP - high complexity OR 50-79 mins 11840-Kogfslybnv OBS or IP - moderate complexity OR 35-49 mins

## 2023-02-09 NOTE — PHARMACOTHERAPY INTERVENTION NOTE - COMMENTS
Recommended to order lithium level to monitor lithium concentration for toxicity as patient is on metronidazole which is know to cause elevated lithium levels. Patient on lithium was started on metronidazole course for BV. There is a drug-interaction with metronidazole in which it may increase lithium levels. It is not a contraindication but is recommended to monitor closely.      Recommended to order lithium level to monitor lithium concentration more frequently.     Provider relayed to this writer would look into it.

## 2023-02-10 LAB — LITHIUM SERPL-MCNC: 0.3 MMOL/L — LOW (ref 0.6–1.2)

## 2023-02-10 PROCEDURE — 99232 SBSQ HOSP IP/OBS MODERATE 35: CPT

## 2023-02-10 RX ORDER — RISPERIDONE 4 MG/1
2 TABLET ORAL AT BEDTIME
Refills: 0 | Status: DISCONTINUED | OUTPATIENT
Start: 2023-02-10 | End: 2023-02-15

## 2023-02-10 RX ORDER — RISPERIDONE 4 MG/1
1 TABLET ORAL DAILY
Refills: 0 | Status: DISCONTINUED | OUTPATIENT
Start: 2023-02-10 | End: 2023-02-14

## 2023-02-10 RX ORDER — TRAZODONE HCL 50 MG
100 TABLET ORAL AT BEDTIME
Refills: 0 | Status: DISCONTINUED | OUTPATIENT
Start: 2023-02-10 | End: 2023-02-15

## 2023-02-10 RX ADMIN — Medication 500 MILLIGRAM(S): at 20:29

## 2023-02-10 RX ADMIN — RISPERIDONE 1 MILLIGRAM(S): 4 TABLET ORAL at 09:18

## 2023-02-10 RX ADMIN — Medication 1 MILLIGRAM(S): at 12:02

## 2023-02-10 RX ADMIN — Medication 1 MILLIGRAM(S): at 09:18

## 2023-02-10 RX ADMIN — Medication 500 MILLIGRAM(S): at 09:18

## 2023-02-10 RX ADMIN — RISPERIDONE 2 MILLIGRAM(S): 4 TABLET ORAL at 20:29

## 2023-02-10 RX ADMIN — LITHIUM CARBONATE 300 MILLIGRAM(S): 300 TABLET, EXTENDED RELEASE ORAL at 20:29

## 2023-02-10 RX ADMIN — Medication 2 MILLIGRAM(S): at 20:29

## 2023-02-10 RX ADMIN — Medication 100 MILLIGRAM(S): at 20:29

## 2023-02-10 NOTE — BH INPATIENT PSYCHIATRY PROGRESS NOTE - NSBHASSESSSUMMFT_PSY_ALL_CORE
27 year old single  AA female.  Patient has PPH of Bipolar Disorder, PTSD  and Cannabis Use Disorder.   Patient has prior multiple inpatient hospitalizations.  Patient last hospitalized at [facility] with recent dc 2 weeks ago.  Patient is now hospitalized with a primary problem of worsening symptoms of Bipolar susanna in context of noncompliance with medications.  Patient admitted to [facility] on a 9.39 legal status.   Patient requires inpatient hospitalization due to symptoms of mental illness so severe that they significantly interfere with activities of daily living, and presents a potential danger to self as a result of psychotic decompensation, susanna. She is requiring 24-hour care at this time as a result, for psychiatric stabilization and safety.    On assessment today patient was irritable, elevated, hyperverbal. Patient doesn't understand why she was transferred and when explains behaved as if she was unsure of information. Patient denied SI/I/P, HI, AH, VH.    Plan:   - Transfer to [facility], 9.39   -No Co needed, routine observation, denied SI/I/P.   -Psychiatry: Continue Lithium 300mg at bedtime, Ativan 1mg TIB, Risperdal 1mg BID.   -Medical : Patient denied any medical history.   -Group and milieu therapy  -Dispo as per social work 27 year old single  AA female.  Patient has PPH of Bipolar Disorder, PTSD  and Cannabis Use Disorder.   Patient has prior multiple inpatient hospitalizations.  Patient last hospitalized at [facility] with recent dc 2 weeks ago.  Patient is now hospitalized with a primary problem of worsening symptoms of Bipolar susanna in context of noncompliance with medications.  Patient admitted to [facility] on a 9.39 legal status.   Patient requires inpatient hospitalization due to symptoms of mental illness so severe that they significantly interfere with activities of daily living, and presents a potential danger to self as a result of psychotic decompensation, susanna. She is requiring 24-hour care at this time as a result, for psychiatric stabilization and safety.    On assessment today patient was irritable, elevated, hyperverbal. Patient doesn't understand why she was transferred and when explains behaved as if she was unsure of information. Patient denied SI/I/P, HI, AH, VH.    Plan:   - Transfer to [facility], 9.39   -No Co needed, routine observation, denied SI/I/P.   -Psychiatry: Continue Lithium 300mg at bedtime, Ativan 1mg TIB, Increased Risperdal to 2mg at bedtime, continue Risperdal 1mg daily.   -Medical : Patient denied any medical history.   -Group and milieu therapy  -Dispo as per social work

## 2023-02-10 NOTE — BH PSYCHOLOGY - GROUP THERAPY NOTE - NSPSYCHOLGRPGENINT_PSY_A_CORE
problem solving techniques discussed/stress management/supported coping skills/social skills training

## 2023-02-10 NOTE — BH PSYCHOLOGY - GROUP THERAPY NOTE - NSPSYCHOLGRPGENGOAL_PSY_A_CORE
improve family functioning/improve level of independent functioning/improve social/vocational/coping skills

## 2023-02-10 NOTE — PHARMACOTHERAPY INTERVENTION NOTE - COMMENTS
Recommended to order lithium level to assess for toxic levels as patient is on metronidazole which is know to cause elevated lithium levels.

## 2023-02-10 NOTE — BH INPATIENT PSYCHIATRY PROGRESS NOTE - NSBHMETABOLIC_PSY_ALL_CORE_FT
BMI: BMI (kg/m2): 60.4 (02-05-23 @ 08:35)  HbA1c: A1C with Estimated Average Glucose Result: 5.0 % (01-14-23 @ 08:25)    Glucose: POCT Blood Glucose.: 103 mg/dL (02-04-23 @ 21:34)    BP: 124/82 (02-09-23 @ 07:57) (124/82 - 124/82)  Lipid Panel: Date/Time: 01-14-23 @ 08:25  Cholesterol, Serum: 199  Direct LDL: --  HDL Cholesterol, Serum: 90  Total Cholesterol/HDL Ration Measurement: --  Triglycerides, Serum: 44   BMI: BMI (kg/m2): 60.4 (02-05-23 @ 08:35)  HbA1c: A1C with Estimated Average Glucose Result: 5.0 % (01-14-23 @ 08:25)    Glucose: POCT Blood Glucose.: 103 mg/dL (02-04-23 @ 21:34)    BP: --  Lipid Panel: Date/Time: 01-14-23 @ 08:25  Cholesterol, Serum: 199  Direct LDL: --  HDL Cholesterol, Serum: 90  Total Cholesterol/HDL Ration Measurement: --  Triglycerides, Serum: 44

## 2023-02-10 NOTE — BH PSYCHOLOGY - GROUP THERAPY NOTE - NSBHPSYCHOLPARTICIPCOMMENT_PSY_A_CORE FT
Patient arrived to group on time and was alert, attentive, and engaged. She spontaneously engaged in group by sharing her reflections and experiences and by asking questions.

## 2023-02-10 NOTE — BH INPATIENT PSYCHIATRY PROGRESS NOTE - PRN MEDS
MEDICATIONS  (PRN):  diphenhydrAMINE Injectable 50 milliGRAM(s) IntraMuscular once PRN Agitation  haloperidol     Tablet 5 milliGRAM(s) Oral every 6 hours PRN agitation  haloperidol    Injectable 5 milliGRAM(s) IntraMuscular every 6 hours PRN Agitation  hydrOXYzine hydrochloride 50 milliGRAM(s) Oral every 6 hours PRN anxiety  LORazepam     Tablet 2 milliGRAM(s) Oral every 6 hours PRN Agitation  LORazepam   Injectable 2 milliGRAM(s) IntraMuscular every 6 hours PRN Agitation   MEDICATIONS  (PRN):  aluminum hydroxide/magnesium hydroxide/simethicone Suspension 30 milliLiter(s) Oral every 6 hours PRN Dyspepsia  diphenhydrAMINE Injectable 50 milliGRAM(s) IntraMuscular once PRN Agitation  haloperidol     Tablet 5 milliGRAM(s) Oral every 6 hours PRN agitation  haloperidol    Injectable 5 milliGRAM(s) IntraMuscular every 6 hours PRN Agitation  hydrOXYzine hydrochloride 50 milliGRAM(s) Oral every 6 hours PRN anxiety  LORazepam     Tablet 2 milliGRAM(s) Oral every 6 hours PRN Agitation  LORazepam   Injectable 2 milliGRAM(s) IntraMuscular every 6 hours PRN Agitation

## 2023-02-10 NOTE — BH PSYCHOLOGY - GROUP THERAPY NOTE - TOKEN PULL-DIAGNOSIS
Primary Diagnosis:  Bipolar disorder [F31.9]        Problem Dx:   Post traumatic stress disorder (PTSD) [F43.10]      Cannabis abuse [F12.10]      Noncompliance with medications [Z91.14]      Cannabis use disorder [F12.90]      Bipolar disorder, current episode manic severe with psychotic features [F31.2]

## 2023-02-10 NOTE — BH INPATIENT PSYCHIATRY PROGRESS NOTE - NSBHCHARTREVIEWVS_PSY_A_CORE FT
Vital Signs Last 24 Hrs  T(C): 36.7 (02-10-23 @ 07:37), Max: 36.7 (02-10-23 @ 07:37)  T(F): 98 (02-10-23 @ 07:37), Max: 98 (02-10-23 @ 07:37)  HR: --  BP: --  BP(mean): --  RR: 18 (02-10-23 @ 07:37) (18 - 18)  SpO2: 99% (02-10-23 @ 07:37) (99% - 99%)    Orthostatic VS  02-10-23 @ 07:37  Lying BP: --/-- HR: --  Sitting BP: 117/77 HR: 93  Standing BP: 115/78 HR: 100  Site: --  Mode: --  Orthostatic VS  02-08-23 @ 22:45  Lying BP: --/-- HR: --  Sitting BP: 135/79 HR: 110  Standing BP: 115/72 HR: 118  Site: --  Mode: --  Orthostatic VS  02-08-23 @ 20:04  Lying BP: --/-- HR: --  Sitting BP: 142/92 HR: 108  Standing BP: --/-- HR: --  Site: --  Mode: --   Vital Signs Last 24 Hrs  T(C): 36.7 (02-10-23 @ 07:37), Max: 36.7 (02-10-23 @ 07:37)  T(F): 98 (02-10-23 @ 07:37), Max: 98 (02-10-23 @ 07:37)  HR: --  BP: --  BP(mean): --  RR: 18 (02-10-23 @ 07:37) (18 - 18)  SpO2: 99% (02-10-23 @ 07:37) (99% - 99%)    Orthostatic VS  02-10-23 @ 07:37  Lying BP: --/-- HR: --  Sitting BP: 117/77 HR: 93  Standing BP: 115/78 HR: 100  Site: --  Mode: --  Orthostatic VS  02-08-23 @ 22:45  Lying BP: --/-- HR: --  Sitting BP: 135/79 HR: 110  Standing BP: 115/72 HR: 118  Site: --  Mode: --   Vital Signs Last 24 Hrs  T(C): 36.5 (02-13-23 @ 09:05), Max: 36.5 (02-13-23 @ 09:05)  T(F): 97.7 (02-13-23 @ 09:05), Max: 97.7 (02-13-23 @ 09:05)  HR: --  BP: --  BP(mean): --  RR: --  SpO2: 100% (02-13-23 @ 09:05) (100% - 100%)    Orthostatic VS  02-13-23 @ 09:05  Lying BP: --/-- HR: --  Sitting BP: 119/79 HR: 90  Standing BP: 122/82 HR: 95  Site: --  Mode: --

## 2023-02-10 NOTE — BH INPATIENT PSYCHIATRY PROGRESS NOTE - CURRENT MEDICATION
MEDICATIONS  (STANDING):  lithium 300 milliGRAM(s) Oral at bedtime  LORazepam     Tablet 1 milliGRAM(s) Oral three times a day  metroNIDAZOLE    Tablet 500 milliGRAM(s) Oral two times a day  risperiDONE   Tablet 1 milliGRAM(s) Oral two times a day  traZODone 50 milliGRAM(s) Oral at bedtime    MEDICATIONS  (PRN):  diphenhydrAMINE Injectable 50 milliGRAM(s) IntraMuscular once PRN Agitation  haloperidol     Tablet 5 milliGRAM(s) Oral every 6 hours PRN agitation  haloperidol    Injectable 5 milliGRAM(s) IntraMuscular every 6 hours PRN Agitation  hydrOXYzine hydrochloride 50 milliGRAM(s) Oral every 6 hours PRN anxiety  LORazepam     Tablet 2 milliGRAM(s) Oral every 6 hours PRN Agitation  LORazepam   Injectable 2 milliGRAM(s) IntraMuscular every 6 hours PRN Agitation   MEDICATIONS  (STANDING):  lithium 300 milliGRAM(s) Oral at bedtime  LORazepam     Tablet 2 milliGRAM(s) Oral at bedtime  LORazepam     Tablet 1 milliGRAM(s) Oral <User Schedule>  metroNIDAZOLE    Tablet 500 milliGRAM(s) Oral two times a day  risperiDONE   Tablet 2 milliGRAM(s) Oral at bedtime  risperiDONE   Tablet 1 milliGRAM(s) Oral daily  traZODone 100 milliGRAM(s) Oral at bedtime    MEDICATIONS  (PRN):  diphenhydrAMINE Injectable 50 milliGRAM(s) IntraMuscular once PRN Agitation  haloperidol     Tablet 5 milliGRAM(s) Oral every 6 hours PRN agitation  haloperidol    Injectable 5 milliGRAM(s) IntraMuscular every 6 hours PRN Agitation  hydrOXYzine hydrochloride 50 milliGRAM(s) Oral every 6 hours PRN anxiety  LORazepam     Tablet 2 milliGRAM(s) Oral every 6 hours PRN Agitation  LORazepam   Injectable 2 milliGRAM(s) IntraMuscular every 6 hours PRN Agitation   MEDICATIONS  (STANDING):  lithium 300 milliGRAM(s) Oral at bedtime  LORazepam     Tablet 2 milliGRAM(s) Oral at bedtime  LORazepam     Tablet 1 milliGRAM(s) Oral <User Schedule>  metroNIDAZOLE    Tablet 500 milliGRAM(s) Oral two times a day  risperiDONE   Tablet 2 milliGRAM(s) Oral at bedtime  risperiDONE   Tablet 1 milliGRAM(s) Oral daily  traZODone 100 milliGRAM(s) Oral at bedtime    MEDICATIONS  (PRN):  aluminum hydroxide/magnesium hydroxide/simethicone Suspension 30 milliLiter(s) Oral every 6 hours PRN Dyspepsia  diphenhydrAMINE Injectable 50 milliGRAM(s) IntraMuscular once PRN Agitation  haloperidol     Tablet 5 milliGRAM(s) Oral every 6 hours PRN agitation  haloperidol    Injectable 5 milliGRAM(s) IntraMuscular every 6 hours PRN Agitation  hydrOXYzine hydrochloride 50 milliGRAM(s) Oral every 6 hours PRN anxiety  LORazepam     Tablet 2 milliGRAM(s) Oral every 6 hours PRN Agitation  LORazepam   Injectable 2 milliGRAM(s) IntraMuscular every 6 hours PRN Agitation

## 2023-02-10 NOTE — PHARMACOTHERAPY INTERVENTION NOTE - COMMENTS
patient was ordered metronidazole and the patient is on lithium , recommendation to monitor lithium levels since Flagyl can increase lithium levels which can lead to lithium toxicity

## 2023-02-10 NOTE — BH PSYCHOLOGY - GROUP THERAPY NOTE - NSPSYCHOLGRPGENPT_PSY_A_CORE FT
Patient attended the women’s issues group today. Group began with a mindfulness activity that prompted participants to focus attention on the sound of a Tibetan singing bowl. Patients shared their reflections on the activity. After the mindfulness activity, group members were asked for their preference on two topics; 1. Review a handout on low-self esteem and how it relates to their experiences 2. Discuss/process challenges they anticipate experiencing after discharge from inpatient treatment. Patients unanimously voted to focus on topic #2. Each patient shared their worries and challenges about managing relationships with family and loved ones, setting boundaries (including setting boundaries on social media) and effectively communicating their needs. Via role play, group members took turns to articulate their situations and received guidance and suggestions from rest of the group on how to effectively communicate with others.

## 2023-02-11 RX ADMIN — Medication 1 MILLIGRAM(S): at 10:03

## 2023-02-11 RX ADMIN — Medication 1 MILLIGRAM(S): at 13:42

## 2023-02-11 RX ADMIN — Medication 100 MILLIGRAM(S): at 20:30

## 2023-02-11 RX ADMIN — RISPERIDONE 2 MILLIGRAM(S): 4 TABLET ORAL at 20:30

## 2023-02-11 RX ADMIN — Medication 2 MILLIGRAM(S): at 20:30

## 2023-02-11 RX ADMIN — Medication 500 MILLIGRAM(S): at 20:29

## 2023-02-11 RX ADMIN — RISPERIDONE 1 MILLIGRAM(S): 4 TABLET ORAL at 10:03

## 2023-02-11 RX ADMIN — LITHIUM CARBONATE 300 MILLIGRAM(S): 300 TABLET, EXTENDED RELEASE ORAL at 20:30

## 2023-02-11 RX ADMIN — Medication 500 MILLIGRAM(S): at 10:03

## 2023-02-11 NOTE — BH CHART NOTE - NSEVENTNOTEFT_PSY_ALL_CORE
RAFAEL paged due to pt reporting episode of vomiting this AM. Pt on Flagyl 500mg BID for BV.  On RAFAEL assessment, pt reports episode of NBNB emesis upon awakening at 6am, with subsequent episode of loose stool, now with some lower abdominal discomfort without persistent nausea. Presentation likely adverse effect of abx tx, less likely infectious etiology. Ordered Maalox PRN. Advised RN to alert RAFAEL if pt with recurrent GI sx given current community prevalence of norovirus.  RAFAEL paged due to pt reporting episode of vomiting this AM. Pt on Flagyl 500mg BID for BV.  On RAFAEL assessment, pt reports episode of NBNB emesis upon awakening at 6am, with subsequent episode of loose stool, now with some lower abdominal discomfort without persistent nausea. VS wnl. Presentation likely adverse effect of abx tx, less likely infectious etiology. Ordered Maalox PRN. Advised RN to alert RAFAEL if pt with recurrent GI sx given current community prevalence of norovirus.

## 2023-02-12 RX ORDER — LACTOBACILLUS ACIDOPHILUS 100MM CELL
1 CAPSULE ORAL ONCE
Refills: 0 | Status: COMPLETED | OUTPATIENT
Start: 2023-02-12 | End: 2023-02-12

## 2023-02-12 RX ORDER — LORATADINE 10 MG/1
10 TABLET ORAL ONCE
Refills: 0 | Status: COMPLETED | OUTPATIENT
Start: 2023-02-12 | End: 2023-02-12

## 2023-02-12 RX ADMIN — Medication 500 MILLIGRAM(S): at 08:43

## 2023-02-12 RX ADMIN — RISPERIDONE 1 MILLIGRAM(S): 4 TABLET ORAL at 08:43

## 2023-02-12 RX ADMIN — Medication 2 MILLIGRAM(S): at 20:47

## 2023-02-12 RX ADMIN — Medication 1 TABLET(S): at 05:29

## 2023-02-12 RX ADMIN — LITHIUM CARBONATE 300 MILLIGRAM(S): 300 TABLET, EXTENDED RELEASE ORAL at 20:46

## 2023-02-12 RX ADMIN — Medication 1 MILLIGRAM(S): at 08:43

## 2023-02-12 RX ADMIN — Medication 1 MILLIGRAM(S): at 12:42

## 2023-02-12 RX ADMIN — Medication 500 MILLIGRAM(S): at 20:47

## 2023-02-12 RX ADMIN — LORATADINE 10 MILLIGRAM(S): 10 TABLET ORAL at 05:28

## 2023-02-12 RX ADMIN — RISPERIDONE 2 MILLIGRAM(S): 4 TABLET ORAL at 20:46

## 2023-02-12 RX ADMIN — Medication 100 MILLIGRAM(S): at 20:47

## 2023-02-13 PROCEDURE — 99232 SBSQ HOSP IP/OBS MODERATE 35: CPT

## 2023-02-13 RX ORDER — LORATADINE 10 MG/1
10 TABLET ORAL ONCE
Refills: 0 | Status: COMPLETED | OUTPATIENT
Start: 2023-02-13 | End: 2023-02-13

## 2023-02-13 RX ORDER — LACTOBACILLUS ACIDOPHILUS 100MM CELL
1 CAPSULE ORAL ONCE
Refills: 0 | Status: COMPLETED | OUTPATIENT
Start: 2023-02-13 | End: 2023-02-13

## 2023-02-13 RX ADMIN — Medication 500 MILLIGRAM(S): at 21:18

## 2023-02-13 RX ADMIN — RISPERIDONE 2 MILLIGRAM(S): 4 TABLET ORAL at 21:18

## 2023-02-13 RX ADMIN — Medication 500 MILLIGRAM(S): at 09:38

## 2023-02-13 RX ADMIN — Medication 1 MILLIGRAM(S): at 08:21

## 2023-02-13 RX ADMIN — Medication 2 MILLIGRAM(S): at 21:18

## 2023-02-13 RX ADMIN — Medication 1 MILLIGRAM(S): at 12:26

## 2023-02-13 RX ADMIN — Medication 100 MILLIGRAM(S): at 21:19

## 2023-02-13 RX ADMIN — Medication 1 TABLET(S): at 06:00

## 2023-02-13 RX ADMIN — LITHIUM CARBONATE 300 MILLIGRAM(S): 300 TABLET, EXTENDED RELEASE ORAL at 21:19

## 2023-02-13 RX ADMIN — LORATADINE 10 MILLIGRAM(S): 10 TABLET ORAL at 06:00

## 2023-02-13 RX ADMIN — RISPERIDONE 1 MILLIGRAM(S): 4 TABLET ORAL at 08:21

## 2023-02-13 NOTE — BH PSYCHOLOGY - CLINICIAN PSYCHOTHERAPY NOTE - NSBHPSYCHOLINT_PSY_A_CORE
Dynamic issues addressed/Encourage medication compliance/Reality testing/Supported coping skills/Supportive therapy/Treatment compliance encouraged
Dynamic issues addressed/Encourage medication compliance/Problem-solving techniques discussed/Reality testing/Supported coping skills/Supportive therapy/Treatment compliance encouraged

## 2023-02-13 NOTE — BH PSYCHOLOGY - CLINICIAN PSYCHOTHERAPY NOTE - NSBHPSYCHOLNARRATIVE_PSY_A_CORE FT
Writer met with patient for second individual session, which focused on safety planning as patient is working towards discharge this week. Patient was oriented x4, alert, and willing to examine her mental state and factors which contribute to her psychiatric struggles at this time. Patient presented as dysphoric and disinterested yet, with the writer providing prompting and empathic listening, the patient was open and cooperative to speaking with the writer and safety planning at this time. Patient began the session reporting to be discharge-focused because she misses her son and because she believes "this hospital to not be a therapeutic environment." Patient reported that some staff provide a healing environment, whereas others do not do so and used the reasoning that "sometimes you win in life, and sometimes you lose." Patient continued to demonstrate limited abilities to be self-reflective and to exhibit limited insight towards reason for admission, the nature of her psychiatric struggles, and the barriers to her treatment when she returns to the community. Although patient was not able to explore deeper or to provide any further elaboration, the patient was able to identify warnings signs, coping strategies, a support network, and future-oriented goals, particularly with respect to her treatment and aim for continuity of care as an outpatient. Patient endorsed a need for clarity about her outpatient care options and plan for discharge. Patient identified her son as her primary protective factor at this time. When the writer queried further about the patient's future short-term and/or long-term goals, the patient denied elaborating further at this time. Patient did not endorse SI, plan, or intent during the session.     Writer made connections between the warning signs, coping strategies, and other parts of the patient's Safety Plan as the patient responded in order to emphasize and validate the relevance of the patient's self-awareness and reported coping skills. Writer also listened empathically, provided compassion, and encouraged the patient to utilize her coping strategies and to reach out to her support system which she identified. Patient stated that she does not want to experience re-hospitalization due to her experiences during this hospitalization. Writer also provided psychoeducation throughout session by highlighting internal experiences and situations (e.g., rapid speech, restlessness, not eating/sleeping) which can alert the patient to her need for support moving forward. Patient was receptive to the writer reiterating and summarizing each of the Safety Plan sections to ensure fidelity and to emphasize the warning signs and coping strategies which the patient identified. Writer emphasized that the patient can contact the Suicide Prevention [employer] phone number as a resource. Patient demonstrated future-oriented thinking by reporting a future-oriented goal at this time: Patient stated that she wants "to be a mother" to her son. Writer provided validation and supportive techniques, emphasizing the patient's progress and the writer's impressions of the patient's mental struggles: the patient's interpersonal relationships are integral for her emotional and psychological well-being. Patient was receptive to these interventions at this time. Writer and patient agreed to briefly check in on Thursday, if the patient is not yet discharged when the writer is back on the unit.

## 2023-02-13 NOTE — BH CHART NOTE - NSPSYPRGNOTEFT_PSY_ALL_CORE
Patient reported feelings of frustration and distrust towards her treatment team, in the context of the patient not being able to FaceTime her son as the patient's provider had organized, during today's completed individual psychotherapy session with the writer. Writer offered to facilitate the patient being able to connect with and speak to her son virtually. Since the patient was receptive to this intervention at the time of the session, the writer approached the patient after the session with an iPad and explained that the writer would facilitate a virtual Zoom meeting with the patient, per the instructions of the patient's provider at this time. Patient denied the opportunity to speak with her son, stating that her son was sick and that coordinating a Zoom session with her cousin, the person who is caring for her son, would be "too much of a hassle." Patient was still receptive to checking in with the writer again on Thursday, if the patient is not yet discharged when the writer is back on the unit.

## 2023-02-13 NOTE — BH INPATIENT PSYCHIATRY PROGRESS NOTE - NSBHCHARTREVIEWVS_PSY_A_CORE FT
Vital Signs Last 24 Hrs  T(C): 36.5 (02-13-23 @ 09:05), Max: 36.5 (02-13-23 @ 09:05)  T(F): 97.7 (02-13-23 @ 09:05), Max: 97.7 (02-13-23 @ 09:05)  HR: --  BP: --  BP(mean): --  RR: --  SpO2: 100% (02-13-23 @ 09:05) (100% - 100%)    Orthostatic VS  02-13-23 @ 09:05  Lying BP: --/-- HR: --  Sitting BP: 119/79 HR: 90  Standing BP: 122/82 HR: 95  Site: --  Mode: --   Vital Signs Last 24 Hrs  T(C): 36.8 (02-14-23 @ 07:18), Max: 36.9 (02-13-23 @ 18:30)  T(F): 98.2 (02-14-23 @ 07:18), Max: 98.5 (02-13-23 @ 18:30)  HR: --  BP: --  BP(mean): --  RR: 18 (02-14-23 @ 07:18) (18 - 18)  SpO2: --    Orthostatic VS  02-14-23 @ 07:18  Lying BP: --/-- HR: --  Sitting BP: 125/79 HR: 88  Standing BP: 115/74 HR: 87  Site: --  Mode: --  Orthostatic VS  02-13-23 @ 09:05  Lying BP: --/-- HR: --  Sitting BP: 119/79 HR: 90  Standing BP: 122/82 HR: 95  Site: --  Mode: --

## 2023-02-13 NOTE — BH INPATIENT PSYCHIATRY PROGRESS NOTE - NSBHMETABOLIC_PSY_ALL_CORE_FT
BMI: BMI (kg/m2): 60.4 (02-05-23 @ 08:35)  HbA1c: A1C with Estimated Average Glucose Result: 5.0 % (01-14-23 @ 08:25)    Glucose: POCT Blood Glucose.: 103 mg/dL (02-04-23 @ 21:34)    BP: --  Lipid Panel: Date/Time: 01-14-23 @ 08:25  Cholesterol, Serum: 199  Direct LDL: --  HDL Cholesterol, Serum: 90  Total Cholesterol/HDL Ration Measurement: --  Triglycerides, Serum: 44

## 2023-02-13 NOTE — BH PSYCHOLOGY - CLINICIAN PSYCHOTHERAPY NOTE - NSBHPSYCHOLPROBS_PSY_ALL_CORE
Academic/Vocational/Social Dysfunction/Impulsivity/Psychosis
Academic/Vocational/Social Dysfunction/Impulsivity/Psychosis

## 2023-02-13 NOTE — BH INPATIENT PSYCHIATRY PROGRESS NOTE - PRN MEDS
MEDICATIONS  (PRN):  aluminum hydroxide/magnesium hydroxide/simethicone Suspension 30 milliLiter(s) Oral every 6 hours PRN Dyspepsia  diphenhydrAMINE Injectable 50 milliGRAM(s) IntraMuscular once PRN Agitation  haloperidol     Tablet 5 milliGRAM(s) Oral every 6 hours PRN agitation  haloperidol    Injectable 5 milliGRAM(s) IntraMuscular every 6 hours PRN Agitation  hydrOXYzine hydrochloride 50 milliGRAM(s) Oral every 6 hours PRN anxiety  LORazepam     Tablet 2 milliGRAM(s) Oral every 6 hours PRN Agitation  LORazepam   Injectable 2 milliGRAM(s) IntraMuscular every 6 hours PRN Agitation

## 2023-02-13 NOTE — BH PSYCHOLOGY - CLINICIAN PSYCHOTHERAPY NOTE - TOKEN PULL-DIAGNOSIS
Primary Diagnosis:  Bipolar disorder [F31.9]        Problem Dx:   Post traumatic stress disorder (PTSD) [F43.10]      Cannabis abuse [F12.10]      Noncompliance with medications [Z91.14]      Cannabis use disorder [F12.90]      Bipolar disorder, current episode manic severe with psychotic features [F31.2]      
Primary Diagnosis:  Bipolar disorder [F31.9]        Problem Dx:   Post traumatic stress disorder (PTSD) [F43.10]      Cannabis abuse [F12.10]      Noncompliance with medications [Z91.14]      Cannabis use disorder [F12.90]      Bipolar disorder, current episode manic severe with psychotic features [F31.2]

## 2023-02-13 NOTE — BH INPATIENT PSYCHIATRY PROGRESS NOTE - NSBHATTESTBILLING_PSY_A_CORE
25945-Kyfbtfyzdb OBS or IP - moderate complexity OR 35-49 mins 26020-Sglyncbuvj OBS or IP - low complexity OR 25-34 mins

## 2023-02-13 NOTE — BH INPATIENT PSYCHIATRY PROGRESS NOTE - CURRENT MEDICATION
MEDICATIONS  (STANDING):  lithium 300 milliGRAM(s) Oral at bedtime  LORazepam     Tablet 2 milliGRAM(s) Oral at bedtime  LORazepam     Tablet 1 milliGRAM(s) Oral <User Schedule>  metroNIDAZOLE    Tablet 500 milliGRAM(s) Oral two times a day  risperiDONE   Tablet 2 milliGRAM(s) Oral at bedtime  risperiDONE   Tablet 1 milliGRAM(s) Oral daily  traZODone 100 milliGRAM(s) Oral at bedtime    MEDICATIONS  (PRN):  aluminum hydroxide/magnesium hydroxide/simethicone Suspension 30 milliLiter(s) Oral every 6 hours PRN Dyspepsia  diphenhydrAMINE Injectable 50 milliGRAM(s) IntraMuscular once PRN Agitation  haloperidol     Tablet 5 milliGRAM(s) Oral every 6 hours PRN agitation  haloperidol    Injectable 5 milliGRAM(s) IntraMuscular every 6 hours PRN Agitation  hydrOXYzine hydrochloride 50 milliGRAM(s) Oral every 6 hours PRN anxiety  LORazepam     Tablet 2 milliGRAM(s) Oral every 6 hours PRN Agitation  LORazepam   Injectable 2 milliGRAM(s) IntraMuscular every 6 hours PRN Agitation

## 2023-02-13 NOTE — BH PSYCHOLOGY - CLINICIAN PSYCHOTHERAPY NOTE - NSTXMANICGOAL_PSY_ALL_CORE
Be able to identify the early signs of susanna (e.g. sleep and mood changes) and to employ coping strategies to minimize acting out
Be able to identify the early signs of susanna (e.g. sleep and mood changes) and to employ coping strategies to minimize acting out

## 2023-02-13 NOTE — BH PSYCHOLOGY - CLINICIAN PSYCHOTHERAPY NOTE - NSBHPSYCHOLGOALS_PSY_A_CORE
Decrease symptoms/Improve level of independent functioning/Improve social/vocational/coping skills/Prevent relapse/Psychoeducation/Treatment compliance
Assessment/Improve level of independent functioning/Improve social/vocational/coping skills/Prevent relapse/Psychoeducation/Treatment compliance

## 2023-02-13 NOTE — BH INPATIENT PSYCHIATRY PROGRESS NOTE - NSBHASSESSSUMMFT_PSY_ALL_CORE
27 year old single  AA female.  Patient has PPH of Bipolar Disorder, PTSD  and Cannabis Use Disorder.   Patient has prior multiple inpatient hospitalizations.  Patient last hospitalized at [facility] with recent dc 2 weeks ago.  Patient is now hospitalized with a primary problem of worsening symptoms of Bipolar susanna in context of noncompliance with medications.  Patient admitted to [facility] on a 9.39 legal status.   Patient requires inpatient hospitalization due to symptoms of mental illness so severe that they significantly interfere with activities of daily living, and presents a potential danger to self as a result of psychotic decompensation, susanna. She is requiring 24-hour care at this time as a result, for psychiatric stabilization and safety.    On assessment today patient reported mood as "fine", affect was blunted. Patient was less irritable, elevated, hyperverbal today. Patient reported fair sleep and appetite. Patient denied SI/I/P, HI, AH, VH.    Plan:   - Transfer to [facility], 9.39   -No Co needed, routine observation, denied SI/I/P.   -Psychiatry: Continue Lithium 300mg at bedtime, Ativan 1mg TIB, continue Risperdal 1mg daily and Risperdal 2mg at bedtime. Lithium level was 0.3 on 2/10  -Medical : Patient denied any medical history.   -Group and milieu therapy  -Dispo as per social work

## 2023-02-14 PROCEDURE — 99231 SBSQ HOSP IP/OBS SF/LOW 25: CPT

## 2023-02-14 RX ORDER — METRONIDAZOLE 500 MG
1 TABLET ORAL
Qty: 10 | Refills: 0
Start: 2023-02-14 | End: 2023-02-18

## 2023-02-14 RX ORDER — RISPERIDONE 4 MG/1
3 TABLET ORAL AT BEDTIME
Refills: 0 | Status: DISCONTINUED | OUTPATIENT
Start: 2023-02-15 | End: 2023-02-15

## 2023-02-14 RX ORDER — TRAZODONE HCL 50 MG
1 TABLET ORAL
Qty: 30 | Refills: 0
Start: 2023-02-14 | End: 2023-03-15

## 2023-02-14 RX ORDER — LORATADINE 10 MG/1
10 TABLET ORAL ONCE
Refills: 0 | Status: COMPLETED | OUTPATIENT
Start: 2023-02-14 | End: 2023-02-14

## 2023-02-14 RX ORDER — RISPERIDONE 4 MG/1
1 TABLET ORAL
Qty: 30 | Refills: 0
Start: 2023-02-14 | End: 2023-03-15

## 2023-02-14 RX ADMIN — RISPERIDONE 1 MILLIGRAM(S): 4 TABLET ORAL at 08:52

## 2023-02-14 RX ADMIN — Medication 2 MILLIGRAM(S): at 21:08

## 2023-02-14 RX ADMIN — Medication 100 MILLIGRAM(S): at 21:09

## 2023-02-14 RX ADMIN — RISPERIDONE 2 MILLIGRAM(S): 4 TABLET ORAL at 21:09

## 2023-02-14 RX ADMIN — Medication 1 MILLIGRAM(S): at 08:52

## 2023-02-14 RX ADMIN — Medication 500 MILLIGRAM(S): at 08:52

## 2023-02-14 RX ADMIN — Medication 1 MILLIGRAM(S): at 12:01

## 2023-02-14 RX ADMIN — LORATADINE 10 MILLIGRAM(S): 10 TABLET ORAL at 05:38

## 2023-02-14 RX ADMIN — Medication 50 MILLIGRAM(S): at 14:00

## 2023-02-14 NOTE — BH INPATIENT PSYCHIATRY PROGRESS NOTE - NSBHCHARTREVIEWVS_PSY_A_CORE FT
Vital Signs Last 24 Hrs  T(C): 36.8 (02-14-23 @ 07:18), Max: 36.9 (02-13-23 @ 18:30)  T(F): 98.2 (02-14-23 @ 07:18), Max: 98.5 (02-13-23 @ 18:30)  HR: --  BP: --  BP(mean): --  RR: 18 (02-14-23 @ 07:18) (18 - 18)  SpO2: --    Orthostatic VS  02-14-23 @ 07:18  Lying BP: --/-- HR: --  Sitting BP: 125/79 HR: 88  Standing BP: 115/74 HR: 87  Site: --  Mode: --  Orthostatic VS  02-13-23 @ 09:05  Lying BP: --/-- HR: --  Sitting BP: 119/79 HR: 90  Standing BP: 122/82 HR: 95  Site: --  Mode: --   Vital Signs Last 24 Hrs  T(C): 36.5 (02-14-23 @ 18:50), Max: 36.8 (02-14-23 @ 07:18)  T(F): 97.7 (02-14-23 @ 18:50), Max: 98.2 (02-14-23 @ 07:18)  HR: --  BP: --  BP(mean): --  RR: 18 (02-14-23 @ 07:18) (18 - 18)  SpO2: --    Orthostatic VS  02-14-23 @ 07:18  Lying BP: --/-- HR: --  Sitting BP: 125/79 HR: 88  Standing BP: 115/74 HR: 87  Site: --  Mode: --  Orthostatic VS  02-13-23 @ 09:05  Lying BP: --/-- HR: --  Sitting BP: 119/79 HR: 90  Standing BP: 122/82 HR: 95  Site: --  Mode: --   Vital Signs Last 24 Hrs  T(C): 36.3 (02-15-23 @ 07:04), Max: 36.3 (02-15-23 @ 07:04)  T(F): 97.4 (02-15-23 @ 07:04), Max: 97.4 (02-15-23 @ 07:04)  HR: --  BP: --  BP(mean): --  RR: 18 (02-15-23 @ 07:04) (18 - 18)  SpO2: 99% (02-15-23 @ 07:04) (99% - 99%)    Orthostatic VS  02-15-23 @ 07:04  Lying BP: --/-- HR: --  Sitting BP: 125/82 HR: 107  Standing BP: 124/74 HR: 100  Site: --  Mode: --  Orthostatic VS  02-14-23 @ 07:18  Lying BP: --/-- HR: --  Sitting BP: 125/79 HR: 88  Standing BP: 115/74 HR: 87  Site: --  Mode: --

## 2023-02-14 NOTE — BH TREATMENT PLAN - NSTXPLANTHERAPYSESSIONSFT_PSY_ALL_CORE
02-12-23  Type of therapy: Creative arts therapy,Leisure development  Type of session: Individual  Level of patient participation: Attentive,Participates  Duration of participation: 15 minutes  Therapy conducted by: Psych rehab  Therapy Summary: Writer met with patient to assess progress of psychiatric rehabilitation goal over the past week. Patient reported no change in symptoms and exhibited limited insight towards reason for admission. Patient stated that she is eager for discharge and to see her son. Patient discussed her plan to file complaints with her  as she does not feel that she is treated justly. Writer encouraged patient to verbalize treatment needs and concerns with her team and asked for ways that that the team can support her. Patient asked writer for headphones and writer fulfilled her request.     Over the past seven days, patient was visible on the unit, attended 33% of groups, and exhibited poor boundaries with staff and peers. Patient is medication compliant with encouragement from staff. Patient has fair behavioral control, good ADLs, and denied SI, HI, AH, and VH.

## 2023-02-14 NOTE — BH TREATMENT PLAN - NSTXMEDICINTERPR_PSY_ALL_CORE
Patient made some progress towards her treatment goal evidenced by her ability to maintain medication compliance with encouragement from staff. Over the next seven days, Psychiatric rehabilitation staff will continue to provide encouragement, support, psychotherapy, and psychoeducation to assist the patient in strengthening medication adherence for discharge.

## 2023-02-14 NOTE — DIETITIAN INITIAL EVALUATION ADULT - OTHER INFO
Patient admitted to [facility] for worsening symptoms of bipolar disorder; PPHx: bipolar disorder, PTSD, cannabis use disorder, multiple inpatient hospitalizations. Spoke to patient in the day area. Patient endorses good appetite. Confirmed allergy to nuts. Denies GI distress or weight changes. Has food preferences in place. Food preferences updated.

## 2023-02-14 NOTE — BH DISCHARGE NOTE NURSING/SOCIAL WORK/PSYCH REHAB - NSDCPRRECOMMEND_PSY_ALL_CORE
Psychiatric Rehabilitation staff recommends that the patient engage in outpatient services for continued medication and symptom management.

## 2023-02-14 NOTE — BH INPATIENT PSYCHIATRY PROGRESS NOTE - PRN MEDS
MEDICATIONS  (PRN):  aluminum hydroxide/magnesium hydroxide/simethicone Suspension 30 milliLiter(s) Oral every 6 hours PRN Dyspepsia  diphenhydrAMINE Injectable 50 milliGRAM(s) IntraMuscular once PRN Agitation  haloperidol     Tablet 5 milliGRAM(s) Oral every 6 hours PRN agitation  haloperidol    Injectable 5 milliGRAM(s) IntraMuscular every 6 hours PRN Agitation  hydrOXYzine hydrochloride 50 milliGRAM(s) Oral every 6 hours PRN anxiety  LORazepam     Tablet 2 milliGRAM(s) Oral every 6 hours PRN Agitation  LORazepam   Injectable 2 milliGRAM(s) IntraMuscular every 6 hours PRN Agitation   MEDICATIONS  (PRN):  aluminum hydroxide/magnesium hydroxide/simethicone Suspension 30 milliLiter(s) Oral every 6 hours PRN Dyspepsia  diphenhydrAMINE Injectable 50 milliGRAM(s) IntraMuscular once PRN Agitation  haloperidol     Tablet 5 milliGRAM(s) Oral every 6 hours PRN agitation  haloperidol    Injectable 5 milliGRAM(s) IntraMuscular every 6 hours PRN Agitation  hydrOXYzine hydrochloride 50 milliGRAM(s) Oral every 6 hours PRN anxiety

## 2023-02-14 NOTE — BH DISCHARGE NOTE NURSING/SOCIAL WORK/PSYCH REHAB - PATIENT PORTAL LINK FT
You can access the FollowMyHealth Patient Portal offered by [facility] by registering at the following website: [email or web address]. By joining [facility]’s FollowMyHealth portal, you will also be able to view your health information using other applications (apps) compatible with our system.

## 2023-02-14 NOTE — BH DISCHARGE NOTE NURSING/SOCIAL WORK/PSYCH REHAB - NSCDUDCCRISIS_PSY_A_CORE
[facility] Well  [phone] [facility]-WELL ([phone])  Text "WELL" to [phone]  Website: [email or web address]/.[employer] 1 [phone] 621-HZPC ([phone]) Website: www.safehorizon.org/.National Suicide Prevention Lifeline 3 (590) 720-8535/.  [employer]  1 (389) LIFENET (158-5704)/.  [facility]  [address] [place] [address] [phone]   Hours:  Monday through Friday from 9 AM to 3 PM/988 Suicide and Crisis Lifeline

## 2023-02-14 NOTE — BH DISCHARGE NOTE NURSING/SOCIAL WORK/PSYCH REHAB - NSDCPRGOAL_PSY_ALL_CORE
Upon admission, the patient was BIB her cousin for worsening of symptoms of bipolar disorder. The patient met her psychiatric rehabilitation goal to take all medications as prescribed for her medication/ treatment non-compliance goal. The patient reported setting boundaries as her coping skill. The patient is compliant with her medications, engages with her peers, and has good ADL’s and behavioral control.  The patient attended creative art, leisure, and peer advocate Psychiatric Rehabilitation groups. Psychiatric Rehabilitation staff recommends that the patient engage in outpatient services for continued medication and symptom management.   Upon admission, the patient was BIB her cousin for worsening of symptoms of bipolar disorder. The patient met her psychiatric rehabilitation goal to take all medications as prescribed for her medication/ treatment non-compliance goal. The patient reported setting boundaries as her coping skill. The patient is compliant with her medications, engages with her peers, and has good ADL’s and behavioral control. The patient attended creative art, leisure, and peer advocate Psychiatric Rehabilitation groups. The patient denied SI/HI/AH./VH. Psychiatric Rehabilitation staff recommends that the patient engage in outpatient services for continued medication and symptom management.

## 2023-02-14 NOTE — BH DISCHARGE NOTE NURSING/SOCIAL WORK/PSYCH REHAB - DISCHARGE INSTRUCTIONS AFTERCARE APPOINTMENTS
In order to check the location, date, or time of your aftercare appointment, please refer to your Discharge Instructions Document given to you upon leaving the hospital.  If you have lost the instructions please call [phone]

## 2023-02-14 NOTE — BH INPATIENT PSYCHIATRY PROGRESS NOTE - CURRENT MEDICATION
MEDICATIONS  (STANDING):  lithium 300 milliGRAM(s) Oral at bedtime  LORazepam     Tablet 2 milliGRAM(s) Oral at bedtime  LORazepam     Tablet 1 milliGRAM(s) Oral <User Schedule>  metroNIDAZOLE    Tablet 500 milliGRAM(s) Oral two times a day  risperiDONE   Tablet 2 milliGRAM(s) Oral at bedtime  risperiDONE   Tablet 1 milliGRAM(s) Oral daily  traZODone 100 milliGRAM(s) Oral at bedtime    MEDICATIONS  (PRN):  aluminum hydroxide/magnesium hydroxide/simethicone Suspension 30 milliLiter(s) Oral every 6 hours PRN Dyspepsia  diphenhydrAMINE Injectable 50 milliGRAM(s) IntraMuscular once PRN Agitation  haloperidol     Tablet 5 milliGRAM(s) Oral every 6 hours PRN agitation  haloperidol    Injectable 5 milliGRAM(s) IntraMuscular every 6 hours PRN Agitation  hydrOXYzine hydrochloride 50 milliGRAM(s) Oral every 6 hours PRN anxiety  LORazepam     Tablet 2 milliGRAM(s) Oral every 6 hours PRN Agitation  LORazepam   Injectable 2 milliGRAM(s) IntraMuscular every 6 hours PRN Agitation   MEDICATIONS  (STANDING):  LORazepam     Tablet 2 milliGRAM(s) Oral at bedtime  LORazepam     Tablet 1 milliGRAM(s) Oral <User Schedule>  metroNIDAZOLE    Tablet 500 milliGRAM(s) Oral two times a day  risperiDONE   Tablet 2 milliGRAM(s) Oral at bedtime  traZODone 100 milliGRAM(s) Oral at bedtime    MEDICATIONS  (PRN):  aluminum hydroxide/magnesium hydroxide/simethicone Suspension 30 milliLiter(s) Oral every 6 hours PRN Dyspepsia  diphenhydrAMINE Injectable 50 milliGRAM(s) IntraMuscular once PRN Agitation  haloperidol     Tablet 5 milliGRAM(s) Oral every 6 hours PRN agitation  haloperidol    Injectable 5 milliGRAM(s) IntraMuscular every 6 hours PRN Agitation  hydrOXYzine hydrochloride 50 milliGRAM(s) Oral every 6 hours PRN anxiety  LORazepam     Tablet 2 milliGRAM(s) Oral every 6 hours PRN Agitation  LORazepam   Injectable 2 milliGRAM(s) IntraMuscular every 6 hours PRN Agitation   MEDICATIONS  (STANDING):  LORazepam     Tablet 2 milliGRAM(s) Oral at bedtime  LORazepam     Tablet 1 milliGRAM(s) Oral <User Schedule>  metroNIDAZOLE    Tablet 500 milliGRAM(s) Oral two times a day  risperiDONE   Tablet 2 milliGRAM(s) Oral at bedtime  risperiDONE   Tablet 3 milliGRAM(s) Oral at bedtime  traZODone 100 milliGRAM(s) Oral at bedtime    MEDICATIONS  (PRN):  aluminum hydroxide/magnesium hydroxide/simethicone Suspension 30 milliLiter(s) Oral every 6 hours PRN Dyspepsia  diphenhydrAMINE Injectable 50 milliGRAM(s) IntraMuscular once PRN Agitation  haloperidol     Tablet 5 milliGRAM(s) Oral every 6 hours PRN agitation  haloperidol    Injectable 5 milliGRAM(s) IntraMuscular every 6 hours PRN Agitation  hydrOXYzine hydrochloride 50 milliGRAM(s) Oral every 6 hours PRN anxiety

## 2023-02-14 NOTE — DIETITIAN INITIAL EVALUATION ADULT - PERTINENT LABORATORY DATA
2/4 Cbc and Cmp unremarkable    A1C with Estimated Average Glucose Result: 5.0 % (01-14-23 @ 08:25)

## 2023-02-14 NOTE — BH INPATIENT PSYCHIATRY PROGRESS NOTE - NSBHASSESSSUMMFT_PSY_ALL_CORE
27 year old single  AA female.  Patient has PPH of Bipolar Disorder, PTSD  and Cannabis Use Disorder.   Patient has prior multiple inpatient hospitalizations.  Patient last hospitalized at [facility] with recent dc 2 weeks ago.  Patient is now hospitalized with a primary problem of worsening symptoms of Bipolar susanna in context of noncompliance with medications.  Patient admitted to [facility] on a 9.39 legal status.   Patient requires inpatient hospitalization due to symptoms of mental illness so severe that they significantly interfere with activities of daily living, and presents a potential danger to self as a result of psychotic decompensation, susanna. She is requiring 24-hour care at this time as a result, for psychiatric stabilization and safety.    On assessment today patient reported mood as "better", affect was blunted. Patient was less irritable, elevated, hyperverbal today. Patient reported fair sleep and appetite. Patient denied SI/I/P, HI, AH, VH.    Plan:   - Transfer to [facility], 9.39   -No Co needed, routine observation, denied SI/I/P.   -Psychiatry: D/C Salmon, continue continue Ativan 1mg at 9am and 1pm, 2mg at bedtime, Risperdal 1mg daily and Risperdal 2mg at bedtime, changed to 3mg at bedtime starting tomorrow. Lithium level was 0.3 on 2/10  -Medical : Patient denied any medical history.   -Group and milieu therapy  -Dispo as per social work

## 2023-02-15 VITALS — RESPIRATION RATE: 18 BRPM | TEMPERATURE: 97 F | OXYGEN SATURATION: 99 %

## 2023-02-15 PROCEDURE — 99238 HOSP IP/OBS DSCHRG MGMT 30/<: CPT

## 2023-02-15 RX ORDER — LORATADINE 10 MG/1
10 TABLET ORAL ONCE
Refills: 0 | Status: COMPLETED | OUTPATIENT
Start: 2023-02-15 | End: 2023-02-15

## 2023-02-15 RX ADMIN — Medication 1 MILLIGRAM(S): at 08:11

## 2023-02-15 RX ADMIN — Medication 1 MILLIGRAM(S): at 12:15

## 2023-02-15 RX ADMIN — Medication 500 MILLIGRAM(S): at 08:12

## 2023-02-15 RX ADMIN — LORATADINE 10 MILLIGRAM(S): 10 TABLET ORAL at 06:04
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
## 2023-02-15 NOTE — BH INPATIENT PSYCHIATRY PROGRESS NOTE - NSBHMSETHTPROC_PSY_A_CORE
Circumstantial/Illogical
Disorganized/Circumstantial/Tangential/Illogical
Circumstantial/Illogical
Circumstantial/Illogical
Disorganized/Circumstantial/Illogical/Other
Circumstantial/Illogical
Disorganized/Circumstantial/Illogical
Disorganized/Circumstantial/Illogical/Other

## 2023-02-15 NOTE — BH INPATIENT PSYCHIATRY DISCHARGE NOTE - NSBHDCHANDOFFFT_PSY_ALL_CORE
Discharge summary faxed to (provider/clinic). Discussed treatment plan and meds included my contact information Bree Simpson NP, [phone].

## 2023-02-15 NOTE — BH INPATIENT PSYCHIATRY PROGRESS NOTE - NSTXMANICGOAL_PSY_ALL_CORE
Be able to identify the early signs of susanna (e.g. sleep and mood changes) and to employ coping strategies to minimize acting out
Be able to identify the early signs of susanna (e.g. sleep and mood changes) and to employ coping strategies to minimize acting out
Exhibit a substantial reduction in elated/angry acting out, and pressured speech that prevents mutual conversation
Be able to identify the early signs of susanna (e.g. sleep and mood changes) and to employ coping strategies to minimize acting out

## 2023-02-15 NOTE — BH INPATIENT PSYCHIATRY PROGRESS NOTE - NSBHMSESPABN_PSY_A_CORE
Increased productivity
Increased productivity/Other
Other
Other
Increased productivity
Soft volume/Slowed rate/Decreased productivity/Other

## 2023-02-15 NOTE — BH INPATIENT PSYCHIATRY PROGRESS NOTE - NSBHATTESTAPPBILLTIME_PSY_A_CORE
I attest my time as CORNELIO is greater than 50% of the total combined time spent on qualifying patient care activities. I have reviewed and verified the documentation.

## 2023-02-15 NOTE — BH INPATIENT PSYCHIATRY PROGRESS NOTE - NSBHFUPINTERVALCCFT_PSY_A_CORE
"When can I get out of her?"
Patient seen, chart reviewed, case discussed with team.  Patient seen for follow up of bipolar disorder.
"When can I get out of her?"
Patient seen, chart reviewed, case discussed with team.  Patient seen for follow up of bipolar disorder.
Patient seen, chart reviewed, case discussed with team.  Patient seen for follow up of bipolar disorder.
"I don't want to take medications. I will get pregnant every year until I can't anymore so I don't have to take medications"
"When can I get out of her?"
"When can I get out of her?"

## 2023-02-15 NOTE — BH INPATIENT PSYCHIATRY PROGRESS NOTE - NSBHATTESTATTENDPERFORM_PSY_A_CORE
Medical Decision Making
Exam/Medical Decision Making

## 2023-02-15 NOTE — BH INPATIENT PSYCHIATRY DISCHARGE NOTE - DESCRIPTION
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

## 2023-02-15 NOTE — BH INPATIENT PSYCHIATRY PROGRESS NOTE - NSBHCONSULTIPREASON_PSY_A_CORE
danger to self; mental illness expected to respond to inpatient care
No
danger to self; mental illness expected to respond to inpatient care

## 2023-02-15 NOTE — BH INPATIENT PSYCHIATRY DISCHARGE NOTE - OTHER PAST PSYCHIATRIC HISTORY (INCLUDE DETAILS REGARDING ONSET, COURSE OF ILLNESS, INPATIENT/OUTPATIENT TREATMENT)
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 Patient has a history of Bipolar Disorder, PTSD, and cannabis use disorder. Patient has multiple inpatient hospitalizations, most recently at [facility] 2 weeks ago. Patient has a history of noncompliance with medications. She sees Dr. David Garcia in the community, currently does not see a therapist. Patient has a history of trauma. Patient has no known legal history. Patient was brought to the ED by her cousin for worsening susanna. Patient was recently brought to [facility] by her close friend (Debra) however left after having to wait to be seen, also for worsening susanna. Patient reportedly discharged from [facility] with some residual symptoms, and family feels she was not ready and still was elevated. Since her release patient reportedly has been irritable and on edge, and quit her job which was a surprise to family and out of character.

## 2023-02-15 NOTE — BH INPATIENT PSYCHIATRY DISCHARGE NOTE - NSDCCPCAREPLAN_GEN_ALL_CORE_FT
PRINCIPAL DISCHARGE DIAGNOSIS  Diagnosis: Bipolar disorder  Assessment and Plan of Treatment:       SECONDARY DISCHARGE DIAGNOSES  Diagnosis: Post traumatic stress disorder (PTSD)  Assessment and Plan of Treatment:

## 2023-02-15 NOTE — BH INPATIENT PSYCHIATRY PROGRESS NOTE - NSBHATTESTATTENDBILLTIME2_PSY_A_CORE
I have reviewed and verified the documentation.

## 2023-02-15 NOTE — BH INPATIENT PSYCHIATRY PROGRESS NOTE - NSDCCRITERIA_PSY_ALL_CORE
symptom stabilization  CGI<=3

## 2023-02-15 NOTE — BH INPATIENT PSYCHIATRY PROGRESS NOTE - CURRENT MEDICATION
MEDICATIONS  (STANDING):  LORazepam     Tablet 2 milliGRAM(s) Oral at bedtime  LORazepam     Tablet 1 milliGRAM(s) Oral <User Schedule>  metroNIDAZOLE    Tablet 500 milliGRAM(s) Oral two times a day  risperiDONE   Tablet 2 milliGRAM(s) Oral at bedtime  risperiDONE   Tablet 3 milliGRAM(s) Oral at bedtime  traZODone 100 milliGRAM(s) Oral at bedtime    MEDICATIONS  (PRN):  aluminum hydroxide/magnesium hydroxide/simethicone Suspension 30 milliLiter(s) Oral every 6 hours PRN Dyspepsia  diphenhydrAMINE Injectable 50 milliGRAM(s) IntraMuscular once PRN Agitation  haloperidol     Tablet 5 milliGRAM(s) Oral every 6 hours PRN agitation  haloperidol    Injectable 5 milliGRAM(s) IntraMuscular every 6 hours PRN Agitation  hydrOXYzine hydrochloride 50 milliGRAM(s) Oral every 6 hours PRN anxiety   MEDICATIONS  (STANDING):    MEDICATIONS  (PRN):

## 2023-02-15 NOTE — BH INPATIENT PSYCHIATRY PROGRESS NOTE - NSBHMSETHTCONTENT_PSY_A_CORE
paranoid about staff/Delusions/Preoccupations
paranoid about staff/Delusions/Other
paranoid about staff/Delusions/Preoccupations
paranoid about staff/Delusions/Preoccupations
paranoid about staff/Delusions/Other
paranoid about staff/Delusions/Preoccupations
paranoid about staff/Delusions/Other
paranoid about staff/Delusions/Other

## 2023-02-15 NOTE — BH INPATIENT PSYCHIATRY PROGRESS NOTE - NSBHCHARTREVIEWLAB_PSY_A_CORE FT
Admission labs reviewed no acute findings  utox pending  Lithium level less than 0.1

## 2023-02-15 NOTE — BH INPATIENT PSYCHIATRY PROGRESS NOTE - NSBHCHARTREVIEWVS_PSY_A_CORE FT
Vital Signs Last 24 Hrs  T(C): 36.3 (02-15-23 @ 07:04), Max: 36.5 (02-14-23 @ 18:50)  T(F): 97.4 (02-15-23 @ 07:04), Max: 97.7 (02-14-23 @ 18:50)  HR: --  BP: --  BP(mean): --  RR: 18 (02-15-23 @ 07:04) (18 - 18)  SpO2: 99% (02-15-23 @ 07:04) (99% - 99%)    Orthostatic VS  02-15-23 @ 07:04  Lying BP: --/-- HR: --  Sitting BP: 125/82 HR: 107  Standing BP: 124/74 HR: 100  Site: --  Mode: --  Orthostatic VS  02-14-23 @ 07:18  Lying BP: --/-- HR: --  Sitting BP: 125/79 HR: 88  Standing BP: 115/74 HR: 87  Site: --  Mode: --   Vital Signs Last 24 Hrs  T(C): 36.3 (02-15-23 @ 07:04), Max: 36.3 (02-15-23 @ 07:04)  T(F): 97.4 (02-15-23 @ 07:04), Max: 97.4 (02-15-23 @ 07:04)  HR: --  BP: --  BP(mean): --  RR: 18 (02-15-23 @ 07:04) (18 - 18)  SpO2: 99% (02-15-23 @ 07:04) (99% - 99%)    Orthostatic VS  02-15-23 @ 07:04  Lying BP: --/-- HR: --  Sitting BP: 125/82 HR: 107  Standing BP: 124/74 HR: 100  Site: --  Mode: --  Orthostatic VS  02-14-23 @ 07:18  Lying BP: --/-- HR: --  Sitting BP: 125/79 HR: 88  Standing BP: 115/74 HR: 87  Site: --  Mode: --   Vital Signs Last 24 Hrs  T(C): --  T(F): --  HR: --  BP: --  BP(mean): --  RR: --  SpO2: --    Orthostatic VS  02-15-23 @ 07:04  Lying BP: --/-- HR: --  Sitting BP: 125/82 HR: 107  Standing BP: 124/74 HR: 100  Site: --  Mode: --

## 2023-02-15 NOTE — BH INPATIENT PSYCHIATRY PROGRESS NOTE - NSBHMSEMUSCLE_PSY_A_CORE
Unable to assess
Normal muscle tone/strength

## 2023-02-15 NOTE — BH INPATIENT PSYCHIATRY PROGRESS NOTE - NSBHATTESTATTENDBILLTIME_PSY_A_CORE
I independently performed the documented

## 2023-02-15 NOTE — BH INPATIENT PSYCHIATRY PROGRESS NOTE - NSTXDCOPLKDATEEST_PSY_ALL_CORE
06-Feb-2023
14-Feb-2023
06-Feb-2023
risks, benefits, and alternatives of surgery were discussed with patient
06-Feb-2023

## 2023-02-15 NOTE — BH INPATIENT PSYCHIATRY PROGRESS NOTE - ATTENDING COMMENTS
Care was discussed and reviewed in interdisciplinary treatment team. Care was discussed and reviewed in interdisciplinary treatment team.  The patient has continued to show improvement in symptoms.  She states her depression is much improved, and she denies any anxiety.  She denies any SI, and her behavior has been well controlled.  The patient has been sleeping better, without nightmares.  The patient has been fully engaged in treatment on the unit, compliant with medications, and is looking forward to continuing care as an outpatient.  The was able to safety plan appropriately.  The patient’s risk cannot be further decreased with continued inpatient hospitalization.  She is no longer an acute danger to herself or others, and is stable for discharge home.
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
## 2023-02-15 NOTE — BH INPATIENT PSYCHIATRY PROGRESS NOTE - NSTXMEDICGOAL_PSY_ALL_CORE
Take all medications as prescribed
Be able to describe the benefit of medication/treatment
Take all medications as prescribed
Take all medications as prescribed

## 2023-02-15 NOTE — BH INPATIENT PSYCHIATRY PROGRESS NOTE - NSBHATTESTTYPEVISIT_PSY_A_CORE
Attending Only
Attending Only
On-site Attending supervising CORNELIO (99XXX codes)
Attending Only
On-site Attending supervising CORNELIO (99XXX codes)

## 2023-02-15 NOTE — BH INPATIENT PSYCHIATRY PROGRESS NOTE - NSTXMANICINTERMD_PSY_ALL_CORE
Psychoeducation, Psychopharmacology- Titrate Risperdal, consider RAMSAY. 
Psychoeducation, Psychopharmacology- Titrate Risperdal, consider RAMSAY.
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
## 2023-02-15 NOTE — BH INPATIENT PSYCHIATRY PROGRESS NOTE - NSBHMSEAFFQUAL_PSY_A_CORE
Irritable
Euthymic
Depressed/Irritable
Irritable
Euthymic/Depressed/Irritable/Anxious
Depressed/Irritable
Euthymic
Depressed/Irritable

## 2023-02-15 NOTE — BH INPATIENT PSYCHIATRY PROGRESS NOTE - NSBHCONSBHPROVDETAILS_PSY_A_CORE  FT
Collateral obtained in ED pt.’s outpatient psychiatrist Dr. Garcia [phone]
Collateral obtained in ED pt.’s outpatient psychiatrist Dr. Garcia [phone]
Collateral obtained in ED pt.’s outpatient psychiatrist Dr. Garcia [phone]
Collateral obtained in ED pt.’s outpatient psychiatrist Dr. Garcia [phone]
Collateral obtained in ED pt.’s outpatient psychiatrist Dr. Garcia [phone]
Collateral obtained in ED pt.’s outpatient psychiatrist Dr. Garcia [phone]
Collateral obtained in ED pt.’s outpatient psychiatrist Dr. Garcia [phone]
Collateral obtained in ED pt.’s outpatient psychiatrist Dr. Garcia [phone]

## 2023-02-15 NOTE — BH INPATIENT PSYCHIATRY PROGRESS NOTE - NSBHASSESSSUMMFT_PSY_ALL_CORE
27 year old single  AA female.  Patient has PPH of Bipolar Disorder, PTSD  and Cannabis Use Disorder.   Patient has prior multiple inpatient hospitalizations.  Patient last hospitalized at [facility] with recent dc 2 weeks ago.  Patient is now hospitalized with a primary problem of worsening symptoms of Bipolar susanna in context of noncompliance with medications.  Patient admitted to [facility] on a 9.39 legal status.   Patient requires inpatient hospitalization due to symptoms of mental illness so severe that they significantly interfere with activities of daily living, and presents a potential danger to self as a result of psychotic decompensation, susanna. She is requiring 24-hour care at this time as a result, for psychiatric stabilization and safety.    On assessment today patient reported mood as "better", affect was blunted. Patient was less irritable, elevated, hyperverbal today. Patient reported fair sleep and appetite. Patient denied SI/I/P, HI, AH, VH.    Plan:   - Transfer to [facility], 9.39   -No Co needed, routine observation, denied SI/I/P.   -Psychiatry: D/C [place], continue continue Ativan 1mg at 9am and 1pm, 2mg at bedtime, D/C Risperdal 1mg daily and Risperdal 2mg at bedtime, changed to 3mg at bedtime starting tonight. Lithium level was 0.3 on 2/10  -Medical : Patient denied any medical history.   -Group and milieu therapy  -Dispo as per social work. Patient had improved mood and behavioral, was adherent with medications. Patient agreed to continue treatment. Patient no longer requires inpatient hospitalization.

## 2023-02-15 NOTE — BH INPATIENT PSYCHIATRY PROGRESS NOTE - NSBHMSEMOOD_PSY_A_CORE
I'm scared/Irritable/Other
I'm scared/Normal
I'm scared/Normal
I'm scared/Irritable/Other
I'm scared/Depressed/Irritable
I'm scared/Irritable/Other
I'm scared/Anxious/Irritable/Angry
I'm scared/Irritable/Other

## 2023-02-15 NOTE — BH INPATIENT PSYCHIATRY DISCHARGE NOTE - NSDCMRMEDTOKEN_GEN_ALL_CORE_FT
LORazepam 1 mg oral tablet: 1 tab(s) orally once a day MDD:3mg  LORazepam 2 mg oral tablet: 1 tab(s) orally once a day (at bedtime) MDD:3mg  metroNIDAZOLE 500 mg oral tablet: 1 tab(s) orally 2 times a day  risperiDONE 3 mg oral tablet: 1 tab(s) orally once a day (at bedtime)  traZODone 100 mg oral tablet: 1 tab(s) orally once a day (at bedtime)

## 2023-02-15 NOTE — BH INPATIENT PSYCHIATRY DISCHARGE NOTE - ATTENDING DISCHARGE PHYSICAL EXAMINATION:
On day of discharge, the patient has improved significantly and no longer requires inpatient treatment and care. Patient denies all suicidal and aggressive ideation, intent and plan. Patient displays no psychotic symptoms. Patient is not judged to be an acute danger to self or others at this time. She is stable for transition to outpatient level of care.

## 2023-02-15 NOTE — BH INPATIENT PSYCHIATRY PROGRESS NOTE - PRN MEDS
MEDICATIONS  (PRN):  aluminum hydroxide/magnesium hydroxide/simethicone Suspension 30 milliLiter(s) Oral every 6 hours PRN Dyspepsia  diphenhydrAMINE Injectable 50 milliGRAM(s) IntraMuscular once PRN Agitation  haloperidol     Tablet 5 milliGRAM(s) Oral every 6 hours PRN agitation  haloperidol    Injectable 5 milliGRAM(s) IntraMuscular every 6 hours PRN Agitation  hydrOXYzine hydrochloride 50 milliGRAM(s) Oral every 6 hours PRN anxiety   MEDICATIONS  (PRN):

## 2023-02-16 ENCOUNTER — TRANSCRIPTION ENCOUNTER (OUTPATIENT)
Age: 28
End: 2023-02-16

## 2023-02-27 NOTE — SOCIAL WORK POST DISCHARGE FOLLOW UP NOTE - NSBHSWFOLLOWUP_PSY_ALL_CORE_FT
SW outreached pt and left VM with contact information regarding missed appt; SW to outreach tomorrow.

## 2023-02-28 NOTE — SOCIAL WORK POST DISCHARGE FOLLOW UP NOTE - NSBHSWFOLLOWUP_PSY_ALL_CORE_FT
SW outreached pt with f/u information if needed to r/s appt. SW encouraged pt to outreach crisis clinic past 30 days. At the time of d/c, the pt was not determined to be a danger to self or others. This case is closed.

## 2023-05-27 ENCOUNTER — NON-APPOINTMENT (OUTPATIENT)
Age: 28
End: 2023-05-27

## 2023-06-27 NOTE — BH TREATMENT PLAN - NSTXPATIENTPARTICIPATE_PSY_ALL_CORE
Patient participated in identification of needs/problems/goals for treatment/Patient participated in development of after care plan Xolair Pregnancy And Lactation Text: This medication is Pregnancy Category B and is considered safe during pregnancy. This medication is excreted in breast milk.

## 2023-10-09 NOTE — ED ADULT TRIAGE NOTE - ARRIVAL FROM
Pls call pt to schedule stress as ordered by Dr Valente in May, preferably before apt with MD 10/17/23.   Home

## 2023-10-25 ENCOUNTER — NON-APPOINTMENT (OUTPATIENT)
Age: 28
End: 2023-10-25

## 2023-11-29 NOTE — BH INPATIENT PSYCHIATRY PROGRESS NOTE - NSBHATTESTBILLONSITE_PSY_A_CORE
Detail Level: Detailed Depth Of Biopsy: dermis Was A Bandage Applied: Yes Size Of Lesion In Cm: 1 Biopsy Type: H and E Biopsy Method: Dermablade Anesthesia Type: 1% lidocaine with epinephrine Anesthesia Volume In Cc (Will Not Render If 0): 0.5 Additional Anesthesia Volume In Cc (Will Not Render If 0): 0 Hemostasis: Drysol Wound Care: Petrolatum Dressing: bandage Destruction After The Procedure: No Type Of Destruction Used: Curettage Curettage Text: The wound bed was treated with curettage after the biopsy was performed. Cryotherapy Text: The wound bed was treated with cryotherapy after the biopsy was performed. Electrodesiccation Text: The wound bed was treated with electrodesiccation after the biopsy was performed. Electrodesiccation And Curettage Text: The wound bed was treated with electrodesiccation and curettage after the biopsy was performed. Silver Nitrate Text: The wound bed was treated with silver nitrate after the biopsy was performed. Lab: [id number] Lab Facility: [id number] Consent: Written consent was obtained and risks were reviewed including but not limited to scarring, infection, bleeding, scabbing, incomplete removal, nerve damage and allergy to anesthesia. Post-Care Instructions: I reviewed with the patient in detail post-care instructions. Patient is to keep the biopsy site dry overnight, and then apply bacitracin twice daily until healed. Patient may apply hydrogen peroxide soaks to remove any crusting. Notification Instructions: Patient will be notified of biopsy results. However, patient instructed to call the office if not contacted within 2 weeks. Billing Type: Third-Party Bill Information: Selecting Yes will display possible errors in your note based on the variables you have selected. This validation is only offered as a suggestion for you. PLEASE NOTE THAT THE VALIDATION TEXT WILL BE REMOVED WHEN YOU FINALIZE YOUR NOTE. IF YOU WANT TO FAX A PRELIMINARY NOTE YOU WILL NEED TO TOGGLE THIS TO 'NO' IF YOU DO NOT WANT IT IN YOUR FAXED NOTE. CORNELIO to bill Attending to bill

## 2023-12-13 NOTE — ED BEHAVIORAL HEALTH ASSESSMENT NOTE - OCCUPATION
Continued Stay Note  [facility]     Patient Name: Lucia Ellis  MRN: [id number]  Today's Date: 12/13/2023    Admit Date: 12/8/2023    Plan: [facility] skilled pending [employer] Medicare pre-cert, in process   Discharge Plan       Row Name 12/13/23 1421       Plan    Plan [facility] skilled pending [employer] Medicare pre-cert, in process    Patient/Family in Agreement with Plan yes    Plan Comments CCP updated pt and sister at bedside who are agreeable to [facility] for skilled rehab. Per Zane Villalobos can accept for skilled rehab pending [employer] Medicare pre-cert, initiated today. Pharmacy updated and packet in CCP office. Nette Forte LCSW                   Discharge Codes    No documentation.                 Expected Discharge Date and Time       Expected Discharge Date Expected Discharge Time    Dec 14, 2023               Marianela Forte LCSW      at [employer];  counselor at [facility]

## 2024-08-16 ENCOUNTER — APPOINTMENT (OUTPATIENT)
Dept: OBGYN | Facility: CLINIC | Age: 29
End: 2024-08-16
Payer: COMMERCIAL

## 2024-08-16 VITALS
SYSTOLIC BLOOD PRESSURE: 127 MMHG | WEIGHT: 221.25 LBS | DIASTOLIC BLOOD PRESSURE: 85 MMHG | HEART RATE: 92 BPM | BODY MASS INDEX: 34.73 KG/M2 | HEIGHT: 67 IN

## 2024-08-16 DIAGNOSIS — Z83.3 FAMILY HISTORY OF DIABETES MELLITUS: ICD-10-CM

## 2024-08-16 DIAGNOSIS — Z00.00 ENCOUNTER FOR GENERAL ADULT MEDICAL EXAMINATION W/OUT ABNORMAL FINDINGS: ICD-10-CM

## 2024-08-16 DIAGNOSIS — F31.9 BIPOLAR DISORDER, UNSPECIFIED: ICD-10-CM

## 2024-08-16 PROCEDURE — 99385 PREV VISIT NEW AGE 18-39: CPT

## 2024-08-16 RX ORDER — CARIPRAZINE 1.5 MG/1
1.5 CAPSULE, GELATIN COATED ORAL
Refills: 0 | Status: ACTIVE | COMMUNITY

## 2024-08-16 NOTE — PLAN
[FreeTextEntry1] : Wellness exam.  Normal physical examination. Recommendations: Status post Gardasil vaccination. Presently using condoms for contraception. Discussed proper nutrition and physical exercise. Reviewed age-appropriate vaccinations.  History of recurrent bacterial vaginosis.  Cultures pending.  Information given on probiotics and behaviors to prevent recurrent vaginitis. Consider prolonged therapy.

## 2024-08-16 NOTE — PHYSICAL EXAM
[Alert] : alert [No Lymphadenopathy] : no lymphadenopathy [Soft] : soft [Non-tender] : non-tender [Non-distended] : non-distended [No HSM] : No HSM [No Mass] : no mass [FreeTextEntry6] : Soft.  Fibrocystic and glandular.  Nontender.  No predominant nodules or lymphadenopathy [Labia Majora] : normal [Labia Minora] : normal [Normal] : normal [Uterine Adnexae] : normal

## 2024-08-21 DIAGNOSIS — N76.0 ACUTE VAGINITIS: ICD-10-CM

## 2024-08-21 LAB — CYTOLOGY CVX/VAG DOC THIN PREP: ABNORMAL

## 2024-08-21 RX ORDER — TERCONAZOLE 8 MG/G
0.8 CREAM VAGINAL
Qty: 1 | Refills: 0 | Status: ACTIVE | COMMUNITY
Start: 2024-08-21 | End: 1900-01-01

## 2024-08-21 RX ORDER — FLUCONAZOLE 150 MG/1
150 TABLET ORAL
Qty: 4 | Refills: 0 | Status: ACTIVE | COMMUNITY
Start: 2024-08-21 | End: 1900-01-01

## 2024-08-22 LAB
A VAGINAE DNA VAG QL NAA+PROBE: NORMAL
BVAB2 DNA VAG QL NAA+PROBE: NORMAL
C KRUSEI DNA VAG QL NAA+PROBE: NEGATIVE
C KRUSEI DNA VAG QL NAA+PROBE: POSITIVE
C TRACH RRNA SPEC QL NAA+PROBE: NEGATIVE
CANDIDA DNA VAG QL NAA+PROBE: NEGATIVE
MEGA1 DNA VAG QL NAA+PROBE: NORMAL
N GONORRHOEA RRNA SPEC QL NAA+PROBE: NEGATIVE
T VAGINALIS RRNA SPEC QL NAA+PROBE: NEGATIVE

## 2024-08-22 RX ORDER — METRONIDAZOLE 7.5 MG/G
0.75 GEL VAGINAL
Qty: 1 | Refills: 0 | Status: ACTIVE | COMMUNITY
Start: 2024-08-22 | End: 1900-01-01

## 2025-04-24 NOTE — DIETITIAN INITIAL EVALUATION ADULT - WEIGHT (LBS)
Medication:   Requested Prescriptions     Pending Prescriptions Disp Refills    amphetamine-dextroamphetamine (ADDERALL XR) 25 MG extended release capsule 30 capsule 0     Sig: Take 1 capsule by mouth every morning for 30 days.        Last Filled:  03/26/2025 #30 0rf     Patient Phone Number: [phone] (home)     Last appt: 2/18/2025   Next appt: Visit date not found    Last OARRS:       11/18/2024    12:01 PM   RX Monitoring   Periodic Controlled Substance Monitoring Possible medication side effects, risk of tolerance/dependence & alternative treatments discussed.;No signs of potential drug abuse or diversion identified.         
170.4